# Patient Record
Sex: FEMALE | Race: WHITE
[De-identification: names, ages, dates, MRNs, and addresses within clinical notes are randomized per-mention and may not be internally consistent; named-entity substitution may affect disease eponyms.]

---

## 2022-07-12 ENCOUNTER — HOSPITAL ENCOUNTER (INPATIENT)
Dept: HOSPITAL 12 - ER | Age: 87
LOS: 5 days | Discharge: NURSING, REHAB OR LONG-TERM CARE | DRG: 280 | End: 2022-07-17
Admitting: INTERNAL MEDICINE
Payer: MEDICARE

## 2022-07-12 VITALS — WEIGHT: 275.44 LBS | HEIGHT: 59 IN | BODY MASS INDEX: 55.53 KG/M2

## 2022-07-12 VITALS — SYSTOLIC BLOOD PRESSURE: 119 MMHG | DIASTOLIC BLOOD PRESSURE: 58 MMHG

## 2022-07-12 DIAGNOSIS — I11.0: Primary | ICD-10-CM

## 2022-07-12 DIAGNOSIS — I48.91: ICD-10-CM

## 2022-07-12 DIAGNOSIS — Z74.09: ICD-10-CM

## 2022-07-12 DIAGNOSIS — J96.10: ICD-10-CM

## 2022-07-12 DIAGNOSIS — I25.5: ICD-10-CM

## 2022-07-12 DIAGNOSIS — Z95.1: ICD-10-CM

## 2022-07-12 DIAGNOSIS — I25.2: ICD-10-CM

## 2022-07-12 DIAGNOSIS — E78.5: ICD-10-CM

## 2022-07-12 DIAGNOSIS — I44.7: ICD-10-CM

## 2022-07-12 DIAGNOSIS — D68.69: ICD-10-CM

## 2022-07-12 DIAGNOSIS — I21.A1: ICD-10-CM

## 2022-07-12 DIAGNOSIS — I50.41: ICD-10-CM

## 2022-07-12 DIAGNOSIS — Z79.4: ICD-10-CM

## 2022-07-12 DIAGNOSIS — M19.90: ICD-10-CM

## 2022-07-12 DIAGNOSIS — Z20.822: ICD-10-CM

## 2022-07-12 DIAGNOSIS — E11.51: ICD-10-CM

## 2022-07-12 DIAGNOSIS — Z98.61: ICD-10-CM

## 2022-07-12 DIAGNOSIS — Z95.0: ICD-10-CM

## 2022-07-12 DIAGNOSIS — R91.8: ICD-10-CM

## 2022-07-12 DIAGNOSIS — E11.40: ICD-10-CM

## 2022-07-12 DIAGNOSIS — R53.1: ICD-10-CM

## 2022-07-12 DIAGNOSIS — I25.10: ICD-10-CM

## 2022-07-12 LAB
ALP SERPL-CCNC: 65 U/L (ref 50–136)
ALT SERPL W/O P-5'-P-CCNC: 19 U/L (ref 14–59)
AST SERPL-CCNC: 19 U/L (ref 15–37)
BILIRUB DIRECT SERPL-MCNC: 0.4 MG/DL (ref 0–0.2)
BILIRUB SERPL-MCNC: 0.6 MG/DL (ref 0.2–1)
BUN SERPL-MCNC: 23 MG/DL (ref 7–18)
CHLORIDE SERPL-SCNC: 101 MMOL/L (ref 98–107)
CO2 SERPL-SCNC: 32 MMOL/L (ref 21–32)
CREAT SERPL-MCNC: 0.9 MG/DL (ref 0.6–1.3)
GLUCOSE SERPL-MCNC: 214 MG/DL (ref 74–106)
HCT VFR BLD AUTO: 36.1 % (ref 31.2–41.9)
MCH RBC QN AUTO: 30.4 UUG (ref 24.7–32.8)
MCV RBC AUTO: 92.3 FL (ref 75.5–95.3)
PLATELET # BLD AUTO: 162 K/UL (ref 179–408)
POTASSIUM SERPL-SCNC: 4.5 MMOL/L (ref 3.5–5.1)
WS STN SPEC: 6.9 G/DL (ref 6.4–8.2)

## 2022-07-12 PROCEDURE — A4663 DIALYSIS BLOOD PRESSURE CUFF: HCPCS

## 2022-07-12 PROCEDURE — G0378 HOSPITAL OBSERVATION PER HR: HCPCS

## 2022-07-12 RX ADMIN — Medication SCH EACH: at 22:51

## 2022-07-12 RX ADMIN — MIRTAZAPINE SCH MG: 15 TABLET, FILM COATED ORAL at 22:45

## 2022-07-12 RX ADMIN — RANOLAZINE SCH MG: 500 TABLET, FILM COATED, EXTENDED RELEASE ORAL at 22:52

## 2022-07-12 RX ADMIN — DOCUSATE SODIUM SCH MG: 100 CAPSULE, LIQUID FILLED ORAL at 22:45

## 2022-07-12 RX ADMIN — CARVEDILOL SCH MG: 3.12 TABLET, FILM COATED ORAL at 22:44

## 2022-07-12 NOTE — NUR
Admitted patient on Tele floor under the care of Dr Ho, Patient alert oriented, no sob no 
chest pain, tele monitor, sinus rhythm with bbb, patient has no complain of pain, with right 
toes and feet wound with different stages of healing, with +1 edema, left foot has scab plus 
+1 edema, has dentures, patient has $300.00 cash, refused to surrender to staff for safe 
keeping, explained to patient the hospital not responsible when her money stolen or got lost 
for whatever reason.

## 2022-07-12 NOTE — NUR
Pt. admitted to  TELE room 316, under care of Dr. Ho

Belongs List completed

Rebeca RN, Chaitanya RN aware of patient's arrival

## 2022-07-13 VITALS — DIASTOLIC BLOOD PRESSURE: 45 MMHG | SYSTOLIC BLOOD PRESSURE: 113 MMHG

## 2022-07-13 VITALS — SYSTOLIC BLOOD PRESSURE: 126 MMHG | DIASTOLIC BLOOD PRESSURE: 73 MMHG

## 2022-07-13 VITALS — SYSTOLIC BLOOD PRESSURE: 124 MMHG | DIASTOLIC BLOOD PRESSURE: 60 MMHG

## 2022-07-13 VITALS — SYSTOLIC BLOOD PRESSURE: 150 MMHG | DIASTOLIC BLOOD PRESSURE: 67 MMHG

## 2022-07-13 VITALS — SYSTOLIC BLOOD PRESSURE: 126 MMHG | DIASTOLIC BLOOD PRESSURE: 65 MMHG

## 2022-07-13 VITALS — SYSTOLIC BLOOD PRESSURE: 118 MMHG | DIASTOLIC BLOOD PRESSURE: 58 MMHG

## 2022-07-13 LAB
ALP SERPL-CCNC: 58 U/L (ref 50–136)
ALT SERPL W/O P-5'-P-CCNC: 17 U/L (ref 14–59)
AST SERPL-CCNC: 19 U/L (ref 15–37)
BILIRUB SERPL-MCNC: 0.6 MG/DL (ref 0.2–1)
BUN SERPL-MCNC: 21 MG/DL (ref 7–18)
CHLORIDE SERPL-SCNC: 102 MMOL/L (ref 98–107)
CHOLEST SERPL-MCNC: 112 MG/DL (ref ?–200)
CO2 SERPL-SCNC: 36 MMOL/L (ref 21–32)
CREAT SERPL-MCNC: 0.9 MG/DL (ref 0.6–1.3)
GLUCOSE SERPL-MCNC: 133 MG/DL (ref 74–106)
HCT VFR BLD AUTO: 33.6 % (ref 31.2–41.9)
HDLC SERPL-MCNC: 38 MG/DL (ref 40–60)
MAGNESIUM SERPL-MCNC: 1.7 MG/DL (ref 1.8–2.4)
MCH RBC QN AUTO: 31 UUG (ref 24.7–32.8)
MCV RBC AUTO: 92.7 FL (ref 75.5–95.3)
PHOSPHATE SERPL-MCNC: 4.2 MG/DL (ref 2.5–4.9)
PLATELET # BLD AUTO: 140 K/UL (ref 179–408)
POTASSIUM SERPL-SCNC: 4 MMOL/L (ref 3.5–5.1)
TRIGL SERPL-MCNC: 52 MG/DL (ref 30–150)
TSH SERPL DL<=0.005 MIU/L-ACNC: 0.81 MIU/ML (ref 0.36–3.74)
WS STN SPEC: 6.3 G/DL (ref 6.4–8.2)

## 2022-07-13 RX ADMIN — MUPIROCIN SCH APPLIC: 20 OINTMENT TOPICAL at 20:32

## 2022-07-13 RX ADMIN — Medication SCH EACH: at 06:50

## 2022-07-13 RX ADMIN — Medication SCH EACH: at 12:00

## 2022-07-13 RX ADMIN — SODIUM CHLORIDE PRN UNIT: 9 INJECTION, SOLUTION INTRAVENOUS at 09:18

## 2022-07-13 RX ADMIN — Medication SCH EACH: at 17:13

## 2022-07-13 RX ADMIN — FUROSEMIDE SCH MG: 10 INJECTION, SOLUTION INTRAMUSCULAR; INTRAVENOUS at 17:00

## 2022-07-13 RX ADMIN — RANOLAZINE SCH MG: 500 TABLET, FILM COATED, EXTENDED RELEASE ORAL at 09:13

## 2022-07-13 RX ADMIN — SODIUM CHLORIDE PRN UNIT: 9 INJECTION, SOLUTION INTRAVENOUS at 12:00

## 2022-07-13 RX ADMIN — GABAPENTIN SCH MG: 100 CAPSULE ORAL at 09:13

## 2022-07-13 RX ADMIN — RANOLAZINE SCH MG: 500 TABLET, FILM COATED, EXTENDED RELEASE ORAL at 20:32

## 2022-07-13 RX ADMIN — CALCIUM SCH MG: 500 TABLET ORAL at 09:13

## 2022-07-13 RX ADMIN — ASPIRIN SCH MG: 81 TABLET, CHEWABLE ORAL at 09:13

## 2022-07-13 RX ADMIN — MIRTAZAPINE SCH MG: 15 TABLET, FILM COATED ORAL at 20:32

## 2022-07-13 RX ADMIN — SODIUM CHLORIDE PRN UNIT: 9 INJECTION, SOLUTION INTRAVENOUS at 20:38

## 2022-07-13 RX ADMIN — DOCUSATE SODIUM SCH MG: 100 CAPSULE, LIQUID FILLED ORAL at 20:32

## 2022-07-13 RX ADMIN — AMLODIPINE BESYLATE SCH MG: 5 TABLET ORAL at 09:16

## 2022-07-13 RX ADMIN — Medication SCH EACH: at 20:38

## 2022-07-13 RX ADMIN — SODIUM CHLORIDE PRN UNIT: 9 INJECTION, SOLUTION INTRAVENOUS at 17:19

## 2022-07-13 RX ADMIN — THERA TABS SCH UDTAB: TAB at 09:13

## 2022-07-13 RX ADMIN — CARVEDILOL SCH MG: 3.12 TABLET, FILM COATED ORAL at 09:17

## 2022-07-13 RX ADMIN — CARVEDILOL SCH MG: 3.12 TABLET, FILM COATED ORAL at 17:01

## 2022-07-13 RX ADMIN — CLOPIDOGREL BISULFATE SCH MG: 75 TABLET ORAL at 09:13

## 2022-07-13 NOTE — NUR
Received patient lying in bed. In no acute distress. Denies any pain or SOB at this time. On 
O2 at 2LPM via NC in place. O2 sat at 99%. NSR on tele with HR of 85/min. MRSA precaution 
observed. Gerard catheter intact and draining via gravity. Needs assessed and attended to. 
Safety measure initiated and call light within reached.

## 2022-07-13 NOTE — NUR
Pt is a/o x 3-4, presenting with sinus rhythm with bundle branch block on telemetry. SpO2 
99% on 3L nasal cannula, titrated to 2L NC SpO2 98%. Will continue to monitor and titrate as 
tolerated.

## 2022-07-14 VITALS — SYSTOLIC BLOOD PRESSURE: 126 MMHG | DIASTOLIC BLOOD PRESSURE: 68 MMHG

## 2022-07-14 VITALS — SYSTOLIC BLOOD PRESSURE: 123 MMHG | DIASTOLIC BLOOD PRESSURE: 57 MMHG

## 2022-07-14 VITALS — SYSTOLIC BLOOD PRESSURE: 123 MMHG | DIASTOLIC BLOOD PRESSURE: 62 MMHG

## 2022-07-14 VITALS — DIASTOLIC BLOOD PRESSURE: 46 MMHG | SYSTOLIC BLOOD PRESSURE: 114 MMHG

## 2022-07-14 VITALS — DIASTOLIC BLOOD PRESSURE: 58 MMHG | SYSTOLIC BLOOD PRESSURE: 128 MMHG

## 2022-07-14 LAB
BUN SERPL-MCNC: 24 MG/DL (ref 7–18)
CHLORIDE SERPL-SCNC: 102 MMOL/L (ref 98–107)
CO2 SERPL-SCNC: 37 MMOL/L (ref 21–32)
CREAT SERPL-MCNC: 0.8 MG/DL (ref 0.6–1.3)
GLUCOSE SERPL-MCNC: 83 MG/DL (ref 74–106)
HCT VFR BLD AUTO: 31.6 % (ref 31.2–41.9)
MAGNESIUM SERPL-MCNC: 1.7 MG/DL (ref 1.8–2.4)
MCH RBC QN AUTO: 31 UUG (ref 24.7–32.8)
MCV RBC AUTO: 92.2 FL (ref 75.5–95.3)
PHOSPHATE SERPL-MCNC: 4.2 MG/DL (ref 2.5–4.9)
PLATELET # BLD AUTO: 141 K/UL (ref 179–408)
POTASSIUM SERPL-SCNC: 4.5 MMOL/L (ref 3.5–5.1)

## 2022-07-14 RX ADMIN — DOCUSATE SODIUM SCH MG: 100 CAPSULE, LIQUID FILLED ORAL at 20:21

## 2022-07-14 RX ADMIN — MAGNESIUM SULFATE IN DEXTROSE SCH MLS/HR: 10 INJECTION, SOLUTION INTRAVENOUS at 19:15

## 2022-07-14 RX ADMIN — Medication SCH EACH: at 06:31

## 2022-07-14 RX ADMIN — MUPIROCIN SCH APPLIC: 20 OINTMENT TOPICAL at 20:26

## 2022-07-14 RX ADMIN — Medication SCH EACH: at 17:02

## 2022-07-14 RX ADMIN — FUROSEMIDE SCH MG: 10 INJECTION, SOLUTION INTRAMUSCULAR; INTRAVENOUS at 17:02

## 2022-07-14 RX ADMIN — AMLODIPINE BESYLATE SCH MG: 5 TABLET ORAL at 09:02

## 2022-07-14 RX ADMIN — SODIUM CHLORIDE PRN UNIT: 9 INJECTION, SOLUTION INTRAVENOUS at 17:15

## 2022-07-14 RX ADMIN — GABAPENTIN SCH MG: 100 CAPSULE ORAL at 09:02

## 2022-07-14 RX ADMIN — MAGNESIUM SULFATE IN DEXTROSE SCH MLS/HR: 10 INJECTION, SOLUTION INTRAVENOUS at 17:04

## 2022-07-14 RX ADMIN — RANOLAZINE SCH MG: 500 TABLET, FILM COATED, EXTENDED RELEASE ORAL at 09:02

## 2022-07-14 RX ADMIN — Medication SCH ML: at 17:02

## 2022-07-14 RX ADMIN — THERA TABS SCH UDTAB: TAB at 09:02

## 2022-07-14 RX ADMIN — ASPIRIN SCH MG: 81 TABLET, CHEWABLE ORAL at 09:01

## 2022-07-14 RX ADMIN — FUROSEMIDE SCH MG: 10 INJECTION, SOLUTION INTRAMUSCULAR; INTRAVENOUS at 08:57

## 2022-07-14 RX ADMIN — SODIUM CHLORIDE PRN UNIT: 9 INJECTION, SOLUTION INTRAVENOUS at 11:46

## 2022-07-14 RX ADMIN — CARVEDILOL SCH MG: 3.12 TABLET, FILM COATED ORAL at 17:02

## 2022-07-14 RX ADMIN — Medication SCH EACH: at 11:44

## 2022-07-14 RX ADMIN — MIRTAZAPINE SCH MG: 15 TABLET, FILM COATED ORAL at 20:21

## 2022-07-14 RX ADMIN — RANOLAZINE SCH MG: 500 TABLET, FILM COATED, EXTENDED RELEASE ORAL at 20:21

## 2022-07-14 RX ADMIN — Medication SCH EACH: at 20:26

## 2022-07-14 RX ADMIN — MUPIROCIN SCH APPLIC: 20 OINTMENT TOPICAL at 09:18

## 2022-07-14 RX ADMIN — SODIUM CHLORIDE PRN UNIT: 9 INJECTION, SOLUTION INTRAVENOUS at 20:28

## 2022-07-14 RX ADMIN — CARVEDILOL SCH MG: 3.12 TABLET, FILM COATED ORAL at 09:01

## 2022-07-14 RX ADMIN — CALCIUM SCH MG: 500 TABLET ORAL at 09:02

## 2022-07-14 RX ADMIN — CLOPIDOGREL BISULFATE SCH MG: 75 TABLET ORAL at 09:02

## 2022-07-14 NOTE — NUR
Patient c/o SOB, 02 sats 95% on 2L. Breathing is shallow, deep pursed lip breathing teaching 
provided, able to demonstrate back. patient 02 increased to 3L per patient request and 
breathing treatment requested to RT.

## 2022-07-14 NOTE — NUR
In no acute distress. NSR on tele with HR of 75/min. Gerard catheter intact and draining via 
gravity. Needs attended to and met. Safety measure maintained and call light within reached.

## 2022-07-14 NOTE — NUR
Received patient lying in bed. Family at bedside. In no acute distress. Denies any pain or 
SOB at this time. On O2 at 3LPM via NC in place. NSR on tele with HR of 98/min. IV site on 
left wrist area intact and patent. Magnesium infusing. MRSA precaution observed. Gerard 
catheter intact and draining via gravity. Needs assessed and attended to. Safety measure 
initiated and call light within reached.

## 2022-07-15 VITALS — DIASTOLIC BLOOD PRESSURE: 51 MMHG | SYSTOLIC BLOOD PRESSURE: 112 MMHG

## 2022-07-15 VITALS — DIASTOLIC BLOOD PRESSURE: 58 MMHG | SYSTOLIC BLOOD PRESSURE: 127 MMHG

## 2022-07-15 VITALS — SYSTOLIC BLOOD PRESSURE: 109 MMHG | DIASTOLIC BLOOD PRESSURE: 55 MMHG

## 2022-07-15 VITALS — SYSTOLIC BLOOD PRESSURE: 117 MMHG | DIASTOLIC BLOOD PRESSURE: 59 MMHG

## 2022-07-15 VITALS — DIASTOLIC BLOOD PRESSURE: 76 MMHG | SYSTOLIC BLOOD PRESSURE: 149 MMHG

## 2022-07-15 LAB
BUN SERPL-MCNC: 23 MG/DL (ref 7–18)
CHLORIDE SERPL-SCNC: 97 MMOL/L (ref 98–107)
CO2 SERPL-SCNC: 38 MMOL/L (ref 21–32)
CREAT SERPL-MCNC: 0.8 MG/DL (ref 0.6–1.3)
GLUCOSE SERPL-MCNC: 130 MG/DL (ref 74–106)
HCT VFR BLD AUTO: 32.4 % (ref 31.2–41.9)
MAGNESIUM SERPL-MCNC: 1.9 MG/DL (ref 1.8–2.4)
MCH RBC QN AUTO: 30.7 UUG (ref 24.7–32.8)
MCV RBC AUTO: 91.8 FL (ref 75.5–95.3)
PHOSPHATE SERPL-MCNC: 3.5 MG/DL (ref 2.5–4.9)
PLATELET # BLD AUTO: 145 K/UL (ref 179–408)
POTASSIUM SERPL-SCNC: 3.5 MMOL/L (ref 3.5–5.1)

## 2022-07-15 RX ADMIN — MIRTAZAPINE SCH MG: 15 TABLET, FILM COATED ORAL at 20:02

## 2022-07-15 RX ADMIN — SODIUM CHLORIDE PRN UNIT: 9 INJECTION, SOLUTION INTRAVENOUS at 09:24

## 2022-07-15 RX ADMIN — ASPIRIN SCH MG: 81 TABLET, CHEWABLE ORAL at 09:12

## 2022-07-15 RX ADMIN — Medication SCH ML: at 16:37

## 2022-07-15 RX ADMIN — FUROSEMIDE SCH MG: 10 INJECTION, SOLUTION INTRAMUSCULAR; INTRAVENOUS at 09:10

## 2022-07-15 RX ADMIN — Medication SCH EACH: at 11:31

## 2022-07-15 RX ADMIN — Medication SCH ML: at 09:13

## 2022-07-15 RX ADMIN — MUPIROCIN SCH APPLIC: 20 OINTMENT TOPICAL at 20:12

## 2022-07-15 RX ADMIN — DOCUSATE SODIUM SCH MG: 100 CAPSULE, LIQUID FILLED ORAL at 20:02

## 2022-07-15 RX ADMIN — CLOPIDOGREL BISULFATE SCH MG: 75 TABLET ORAL at 09:12

## 2022-07-15 RX ADMIN — RANOLAZINE SCH MG: 500 TABLET, FILM COATED, EXTENDED RELEASE ORAL at 09:12

## 2022-07-15 RX ADMIN — CARVEDILOL SCH MG: 3.12 TABLET, FILM COATED ORAL at 16:36

## 2022-07-15 RX ADMIN — THERA TABS SCH UDTAB: TAB at 09:11

## 2022-07-15 RX ADMIN — CARVEDILOL SCH MG: 3.12 TABLET, FILM COATED ORAL at 09:12

## 2022-07-15 RX ADMIN — FUROSEMIDE SCH MG: 10 INJECTION, SOLUTION INTRAMUSCULAR; INTRAVENOUS at 16:36

## 2022-07-15 RX ADMIN — RANOLAZINE SCH MG: 500 TABLET, FILM COATED, EXTENDED RELEASE ORAL at 20:04

## 2022-07-15 RX ADMIN — MUPIROCIN SCH APPLIC: 20 OINTMENT TOPICAL at 09:13

## 2022-07-15 RX ADMIN — GABAPENTIN SCH MG: 100 CAPSULE ORAL at 09:11

## 2022-07-15 RX ADMIN — SODIUM CHLORIDE PRN UNIT: 9 INJECTION, SOLUTION INTRAVENOUS at 20:19

## 2022-07-15 RX ADMIN — SODIUM CHLORIDE PRN UNIT: 9 INJECTION, SOLUTION INTRAVENOUS at 16:45

## 2022-07-15 RX ADMIN — AMLODIPINE BESYLATE SCH MG: 5 TABLET ORAL at 09:12

## 2022-07-15 RX ADMIN — Medication SCH EACH: at 16:36

## 2022-07-15 RX ADMIN — CALCIUM SCH MG: 500 TABLET ORAL at 09:12

## 2022-07-15 RX ADMIN — SODIUM CHLORIDE PRN UNIT: 9 INJECTION, SOLUTION INTRAVENOUS at 11:35

## 2022-07-15 RX ADMIN — Medication SCH EACH: at 06:37

## 2022-07-15 RX ADMIN — Medication SCH EACH: at 20:17

## 2022-07-15 NOTE — NUR
Patient slept well during the night. In no acute distress. NSR on tele with HR of 85/min. 
Gerard catheter intact and draining via gravity. Needs attended to and met. Safety measure 
maintained and call light within reached.

## 2022-07-15 NOTE — NUR
Patient is alert and oriented *4. Denies any pain. Able to ambulate a few feet with PT with 
assistance and walker, also participates with OT.  On 2L 02 via NC, improved SOB noted as 
compared to 07/14/22 am shift. Denies any chest pain. On telemetry, vpacing with HR in the 
80's  and occasional bundle branch block. with HR in the 80's. Gerard catheter in place, 
draining clear yellow urine. Call light within reach, All needs attended.

## 2022-07-15 NOTE — NUR
Patient alert oriented, no sob no chest pain when at rest, still complain of swelling of the 
leg and thighs, kept leg elevated with pillow, denies pain at this time, unable to lay flat, 
short of breath, cont to monitor,

## 2022-07-16 VITALS — SYSTOLIC BLOOD PRESSURE: 113 MMHG | DIASTOLIC BLOOD PRESSURE: 46 MMHG

## 2022-07-16 VITALS — DIASTOLIC BLOOD PRESSURE: 73 MMHG | SYSTOLIC BLOOD PRESSURE: 133 MMHG

## 2022-07-16 VITALS — DIASTOLIC BLOOD PRESSURE: 56 MMHG | SYSTOLIC BLOOD PRESSURE: 110 MMHG

## 2022-07-16 VITALS — SYSTOLIC BLOOD PRESSURE: 134 MMHG | DIASTOLIC BLOOD PRESSURE: 64 MMHG

## 2022-07-16 VITALS — SYSTOLIC BLOOD PRESSURE: 141 MMHG | DIASTOLIC BLOOD PRESSURE: 68 MMHG

## 2022-07-16 LAB
BUN SERPL-MCNC: 24 MG/DL (ref 7–18)
CHLORIDE SERPL-SCNC: 93 MMOL/L (ref 98–107)
CO2 SERPL-SCNC: 41 MMOL/L (ref 21–32)
CREAT SERPL-MCNC: 0.9 MG/DL (ref 0.6–1.3)
GLUCOSE SERPL-MCNC: 159 MG/DL (ref 74–106)
HCT VFR BLD AUTO: 32.1 % (ref 31.2–41.9)
MAGNESIUM SERPL-MCNC: 1.6 MG/DL (ref 1.8–2.4)
MCH RBC QN AUTO: 30.1 UUG (ref 24.7–32.8)
MCV RBC AUTO: 90.9 FL (ref 75.5–95.3)
PHOSPHATE SERPL-MCNC: 3.5 MG/DL (ref 2.5–4.9)
PLATELET # BLD AUTO: 143 K/UL (ref 179–408)
POTASSIUM SERPL-SCNC: 3 MMOL/L (ref 3.5–5.1)

## 2022-07-16 PROCEDURE — B546ZZA ULTRASONOGRAPHY OF RIGHT SUBCLAVIAN VEIN, GUIDANCE: ICD-10-PCS

## 2022-07-16 PROCEDURE — 05H533Z INSERTION OF INFUSION DEVICE INTO RIGHT SUBCLAVIAN VEIN, PERCUTANEOUS APPROACH: ICD-10-PCS

## 2022-07-16 RX ADMIN — Medication SCH EACH: at 11:03

## 2022-07-16 RX ADMIN — CARVEDILOL SCH MG: 3.12 TABLET, FILM COATED ORAL at 17:19

## 2022-07-16 RX ADMIN — THERA TABS SCH UDTAB: TAB at 08:54

## 2022-07-16 RX ADMIN — FUROSEMIDE SCH MG: 10 INJECTION, SOLUTION INTRAMUSCULAR; INTRAVENOUS at 17:15

## 2022-07-16 RX ADMIN — SODIUM CHLORIDE PRN UNIT: 9 INJECTION, SOLUTION INTRAVENOUS at 11:44

## 2022-07-16 RX ADMIN — MUPIROCIN SCH APPLIC: 20 OINTMENT TOPICAL at 08:54

## 2022-07-16 RX ADMIN — GABAPENTIN SCH MG: 100 CAPSULE ORAL at 08:55

## 2022-07-16 RX ADMIN — CALCIUM SCH MG: 500 TABLET ORAL at 08:54

## 2022-07-16 RX ADMIN — SODIUM CHLORIDE PRN UNIT: 9 INJECTION, SOLUTION INTRAVENOUS at 17:14

## 2022-07-16 RX ADMIN — DOCUSATE SODIUM SCH MG: 100 CAPSULE, LIQUID FILLED ORAL at 20:57

## 2022-07-16 RX ADMIN — SODIUM CHLORIDE PRN UNIT: 9 INJECTION, SOLUTION INTRAVENOUS at 21:00

## 2022-07-16 RX ADMIN — Medication SCH EACH: at 20:58

## 2022-07-16 RX ADMIN — RANOLAZINE SCH MG: 500 TABLET, FILM COATED, EXTENDED RELEASE ORAL at 20:57

## 2022-07-16 RX ADMIN — RANOLAZINE SCH MG: 500 TABLET, FILM COATED, EXTENDED RELEASE ORAL at 08:58

## 2022-07-16 RX ADMIN — AMLODIPINE BESYLATE SCH MG: 5 TABLET ORAL at 08:55

## 2022-07-16 RX ADMIN — Medication SCH EACH: at 17:08

## 2022-07-16 RX ADMIN — MUPIROCIN SCH APPLIC: 20 OINTMENT TOPICAL at 21:00

## 2022-07-16 RX ADMIN — FUROSEMIDE SCH MG: 10 INJECTION, SOLUTION INTRAMUSCULAR; INTRAVENOUS at 10:53

## 2022-07-16 RX ADMIN — ASPIRIN SCH MG: 81 TABLET, CHEWABLE ORAL at 08:54

## 2022-07-16 RX ADMIN — CARVEDILOL SCH MG: 3.12 TABLET, FILM COATED ORAL at 08:56

## 2022-07-16 RX ADMIN — Medication SCH ML: at 17:17

## 2022-07-16 RX ADMIN — Medication SCH ML: at 08:54

## 2022-07-16 RX ADMIN — MIRTAZAPINE SCH MG: 15 TABLET, FILM COATED ORAL at 20:57

## 2022-07-16 RX ADMIN — CLOPIDOGREL BISULFATE SCH MG: 75 TABLET ORAL at 08:55

## 2022-07-16 RX ADMIN — Medication SCH EACH: at 06:00

## 2022-07-16 NOTE — NUR
Patient alert oriented, no sob no chest pain, tele monitor sinus rhythm, v pacing no 
complain of pain, still on 2 liters of oxygen, unable to lay flat in bed short of breath, 
kept hob elevated 35 to 45 degrees, denies pain at this time, cont to monitor.

## 2022-07-16 NOTE — NUR
Received pt. AAOX4. vitals stable on sinus rhythm V- pacing. No IV access. Will continue to 
monitor.

## 2022-07-16 NOTE — NUR
DCD instructions given to patient who verbalized understanding. Patient AAOX4. and able to 
sign all the documentation. With belongings a total of $365 was documented. Nursing office 
called to retrieved the money, since there it no deposit receipt in the Pt's file. As per 
supervisor there's no money in the safety under pt's name. Patient questioned about the 
money and as stated by patient "my nice took the money  and my other belongings home". and 
she signed the belongings list Zenaida CNMELINDA witness pt's statement. supervisor informed. and 
attempts to contact her niece at  [phone] to inquire pt's statements message left. `

## 2022-07-16 NOTE — NUR
per Lachelle Nicolas, Pt will not be discharged tonight per ; she informed Jaya Mccormick. pt apparently med surg status.

## 2022-07-17 VITALS — SYSTOLIC BLOOD PRESSURE: 139 MMHG | DIASTOLIC BLOOD PRESSURE: 68 MMHG

## 2022-07-17 VITALS — SYSTOLIC BLOOD PRESSURE: 123 MMHG | DIASTOLIC BLOOD PRESSURE: 59 MMHG

## 2022-07-17 LAB
BUN SERPL-MCNC: 22 MG/DL (ref 7–18)
CHLORIDE SERPL-SCNC: 94 MMOL/L (ref 98–107)
CO2 SERPL-SCNC: 42 MMOL/L (ref 21–32)
CREAT SERPL-MCNC: 0.7 MG/DL (ref 0.6–1.3)
GLUCOSE SERPL-MCNC: 155 MG/DL (ref 74–106)
MAGNESIUM SERPL-MCNC: 1.7 MG/DL (ref 1.8–2.4)
POTASSIUM SERPL-SCNC: 3.3 MMOL/L (ref 3.5–5.1)

## 2022-07-17 RX ADMIN — Medication SCH ML: at 08:36

## 2022-07-17 RX ADMIN — Medication SCH EACH: at 06:32

## 2022-07-17 RX ADMIN — CLOPIDOGREL BISULFATE SCH MG: 75 TABLET ORAL at 08:36

## 2022-07-17 RX ADMIN — AMLODIPINE BESYLATE SCH MG: 5 TABLET ORAL at 08:33

## 2022-07-17 RX ADMIN — MUPIROCIN SCH APPLIC: 20 OINTMENT TOPICAL at 08:36

## 2022-07-17 RX ADMIN — CALCIUM SCH MG: 500 TABLET ORAL at 08:32

## 2022-07-17 RX ADMIN — FUROSEMIDE SCH MG: 10 INJECTION, SOLUTION INTRAMUSCULAR; INTRAVENOUS at 08:31

## 2022-07-17 RX ADMIN — RANOLAZINE SCH MG: 500 TABLET, FILM COATED, EXTENDED RELEASE ORAL at 08:32

## 2022-07-17 RX ADMIN — THERA TABS SCH UDTAB: TAB at 08:32

## 2022-07-17 RX ADMIN — ASPIRIN SCH MG: 81 TABLET, CHEWABLE ORAL at 08:32

## 2022-07-17 RX ADMIN — GABAPENTIN SCH MG: 100 CAPSULE ORAL at 08:32

## 2022-07-17 RX ADMIN — CARVEDILOL SCH MG: 3.12 TABLET, FILM COATED ORAL at 08:33

## 2022-07-17 RX ADMIN — SODIUM CHLORIDE PRN UNIT: 9 INJECTION, SOLUTION INTRAVENOUS at 08:37

## 2022-07-17 NOTE — NUR
unable to weigh pt for now; it says 278lbd ;  pt does not look she weighs 278lbs; will 
transfer to chair and zero bed and weigh pt.

## 2022-07-17 NOTE — NUR
awake alert and oriented, no distress noted, on 2l/nc sat at 96%, aware that she is going to 
SNF today, needs attended, safety measures maintained

## 2022-07-17 NOTE — NUR
readied for discharge, midline on right upper arm removed- no swelling/redness noted on 
site, all belongings with her, report called to Cristofer at [facility],

## 2022-08-17 ENCOUNTER — HOSPITAL ENCOUNTER (INPATIENT)
Dept: HOSPITAL 12 - ER | Age: 87
LOS: 10 days | Discharge: NURSING, REHAB OR LONG-TERM CARE | DRG: 280 | End: 2022-08-27
Payer: MEDICARE

## 2022-08-17 VITALS — HEIGHT: 60 IN | BODY MASS INDEX: 29.45 KG/M2 | WEIGHT: 150 LBS

## 2022-08-17 VITALS — DIASTOLIC BLOOD PRESSURE: 56 MMHG | SYSTOLIC BLOOD PRESSURE: 108 MMHG

## 2022-08-17 DIAGNOSIS — I25.2: ICD-10-CM
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
## 2022-08-17 DIAGNOSIS — F32.A: ICD-10-CM

## 2022-08-17 DIAGNOSIS — I21.A1: ICD-10-CM

## 2022-08-17 DIAGNOSIS — E11.51: ICD-10-CM

## 2022-08-17 DIAGNOSIS — I13.0: Primary | ICD-10-CM

## 2022-08-17 DIAGNOSIS — E11.22: ICD-10-CM

## 2022-08-17 DIAGNOSIS — I48.0: ICD-10-CM

## 2022-08-17 DIAGNOSIS — Z95.1: ICD-10-CM

## 2022-08-17 DIAGNOSIS — L97.519: ICD-10-CM

## 2022-08-17 DIAGNOSIS — I25.5: ICD-10-CM

## 2022-08-17 DIAGNOSIS — D68.59: ICD-10-CM

## 2022-08-17 DIAGNOSIS — Z79.899: ICD-10-CM

## 2022-08-17 DIAGNOSIS — N18.9: ICD-10-CM

## 2022-08-17 DIAGNOSIS — Z79.4: ICD-10-CM

## 2022-08-17 DIAGNOSIS — Z79.84: ICD-10-CM

## 2022-08-17 DIAGNOSIS — L97.529: ICD-10-CM

## 2022-08-17 DIAGNOSIS — I44.7: ICD-10-CM

## 2022-08-17 DIAGNOSIS — I50.43: ICD-10-CM

## 2022-08-17 DIAGNOSIS — E83.39: ICD-10-CM
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
## 2022-08-17 DIAGNOSIS — I48.92: ICD-10-CM

## 2022-08-17 DIAGNOSIS — I25.10: ICD-10-CM

## 2022-08-17 DIAGNOSIS — Z95.0: ICD-10-CM

## 2022-08-17 DIAGNOSIS — E87.1: ICD-10-CM

## 2022-08-17 DIAGNOSIS — Z79.82: ICD-10-CM

## 2022-08-17 DIAGNOSIS — I77.1: ICD-10-CM

## 2022-08-17 DIAGNOSIS — E88.09: ICD-10-CM

## 2022-08-17 DIAGNOSIS — K59.00: ICD-10-CM

## 2022-08-17 DIAGNOSIS — N17.0: ICD-10-CM

## 2022-08-17 DIAGNOSIS — S90.521A: ICD-10-CM
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
## 2022-08-17 DIAGNOSIS — E78.5: ICD-10-CM
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
## 2022-08-17 LAB
ALP SERPL-CCNC: 61 U/L (ref 50–136)
ALT SERPL W/O P-5'-P-CCNC: 14 U/L (ref 14–59)
AST SERPL-CCNC: 17 U/L (ref 15–37)
BILIRUB SERPL-MCNC: 0.7 MG/DL (ref 0.2–1)
BUN SERPL-MCNC: 38 MG/DL (ref 7–18)
CHLORIDE SERPL-SCNC: 103 MMOL/L (ref 98–107)
CO2 SERPL-SCNC: 30 MMOL/L (ref 21–32)
CREAT SERPL-MCNC: 1.1 MG/DL (ref 0.6–1.3)
GLUCOSE SERPL-MCNC: 166 MG/DL (ref 74–106)
HCT VFR BLD AUTO: 36.3 % (ref 31.2–41.9)
MCH RBC QN AUTO: 31.2 UUG (ref 24.7–32.8)
MCV RBC AUTO: 96 FL (ref 75.5–95.3)
PLATELET # BLD AUTO: 160 K/UL (ref 179–408)
POTASSIUM SERPL-SCNC: 4.1 MMOL/L (ref 3.5–5.1)
WS STN SPEC: 6.9 G/DL (ref 6.4–8.2)

## 2022-08-17 PROCEDURE — G0378 HOSPITAL OBSERVATION PER HR: HCPCS

## 2022-08-17 PROCEDURE — A4663 DIALYSIS BLOOD PRESSURE CUFF: HCPCS

## 2022-08-17 PROCEDURE — A6209 FOAM DRSG <=16 SQ IN W/O BDR: HCPCS

## 2022-08-17 RX ADMIN — MAGNESIUM SULFATE IN DEXTROSE SCH MLS/HR: 10 INJECTION, SOLUTION INTRAVENOUS at 17:50

## 2022-08-17 RX ADMIN — ENOXAPARIN SODIUM SCH MG: 40 INJECTION SUBCUTANEOUS at 22:08

## 2022-08-17 RX ADMIN — MAGNESIUM SULFATE IN DEXTROSE SCH MLS/HR: 10 INJECTION, SOLUTION INTRAVENOUS at 22:07

## 2022-08-17 NOTE — NUR
pt transported to room 311 via [facility] with all belongings.  KRIS Robertson at 
bedside to receive the pt.

## 2022-08-18 VITALS — SYSTOLIC BLOOD PRESSURE: 94 MMHG | DIASTOLIC BLOOD PRESSURE: 48 MMHG

## 2022-08-18 VITALS — DIASTOLIC BLOOD PRESSURE: 52 MMHG | SYSTOLIC BLOOD PRESSURE: 119 MMHG

## 2022-08-18 VITALS — DIASTOLIC BLOOD PRESSURE: 43 MMHG | SYSTOLIC BLOOD PRESSURE: 96 MMHG

## 2022-08-18 LAB
BUN SERPL-MCNC: 39 MG/DL (ref 7–18)
CHLORIDE SERPL-SCNC: 102 MMOL/L (ref 98–107)
CO2 SERPL-SCNC: 25 MMOL/L (ref 21–32)
CREAT SERPL-MCNC: 1.1 MG/DL (ref 0.6–1.3)
GLUCOSE SERPL-MCNC: 168 MG/DL (ref 74–106)
HCT VFR BLD AUTO: 36.6 % (ref 31.2–41.9)
MAGNESIUM SERPL-MCNC: 2.8 MG/DL (ref 1.8–2.4)
MCH RBC QN AUTO: 31.3 UUG (ref 24.7–32.8)
MCV RBC AUTO: 97.4 FL (ref 75.5–95.3)
PHOSPHATE SERPL-MCNC: 5 MG/DL (ref 2.5–4.9)
PLATELET # BLD AUTO: 141 K/UL (ref 179–408)
POTASSIUM SERPL-SCNC: 4.2 MMOL/L (ref 3.5–5.1)

## 2022-08-18 RX ADMIN — SODIUM CHLORIDE PRN UNIT: 9 INJECTION, SOLUTION INTRAVENOUS at 16:37

## 2022-08-18 RX ADMIN — FUROSEMIDE SCH MG: 10 INJECTION, SOLUTION INTRAMUSCULAR; INTRAVENOUS at 20:27

## 2022-08-18 RX ADMIN — SODIUM CHLORIDE PRN UNIT: 9 INJECTION, SOLUTION INTRAVENOUS at 20:37

## 2022-08-18 RX ADMIN — INSULIN GLARGINE SCH UNITS: 100 INJECTION, SOLUTION SUBCUTANEOUS at 21:04

## 2022-08-18 RX ADMIN — FUROSEMIDE SCH MG: 10 INJECTION, SOLUTION INTRAMUSCULAR; INTRAVENOUS at 09:38

## 2022-08-18 RX ADMIN — Medication SCH EACH: at 16:36

## 2022-08-18 RX ADMIN — FERROUS SULFATE TAB 325 MG (65 MG ELEMENTAL FE) SCH MG: 325 (65 FE) TAB at 16:36

## 2022-08-18 RX ADMIN — ENOXAPARIN SODIUM SCH MG: 40 INJECTION SUBCUTANEOUS at 20:27

## 2022-08-18 RX ADMIN — LIDOCAINE SCH PATCH: 50 PATCH CUTANEOUS at 21:31

## 2022-08-18 RX ADMIN — HYDROCODONE BITARTRATE AND ACETAMINOPHEN PRN TAB: 5; 325 TABLET ORAL at 00:19

## 2022-08-18 RX ADMIN — HYDROCODONE BITARTRATE AND ACETAMINOPHEN PRN TAB: 5; 325 TABLET ORAL at 20:29

## 2022-08-18 RX ADMIN — PANTOPRAZOLE SODIUM SCH MG: 40 TABLET, DELAYED RELEASE ORAL at 07:00

## 2022-08-18 RX ADMIN — Medication SCH EACH: at 21:03

## 2022-08-18 RX ADMIN — MIRTAZAPINE SCH MG: 15 TABLET, FILM COATED ORAL at 17:04

## 2022-08-19 VITALS — SYSTOLIC BLOOD PRESSURE: 127 MMHG | DIASTOLIC BLOOD PRESSURE: 66 MMHG

## 2022-08-19 VITALS — SYSTOLIC BLOOD PRESSURE: 103 MMHG | DIASTOLIC BLOOD PRESSURE: 58 MMHG

## 2022-08-19 VITALS — DIASTOLIC BLOOD PRESSURE: 59 MMHG | SYSTOLIC BLOOD PRESSURE: 116 MMHG

## 2022-08-19 VITALS — DIASTOLIC BLOOD PRESSURE: 51 MMHG | SYSTOLIC BLOOD PRESSURE: 112 MMHG

## 2022-08-19 VITALS — SYSTOLIC BLOOD PRESSURE: 109 MMHG | DIASTOLIC BLOOD PRESSURE: 52 MMHG

## 2022-08-19 LAB
ALP SERPL-CCNC: 55 U/L (ref 50–136)
ALT SERPL W/O P-5'-P-CCNC: 21 U/L (ref 14–59)
AST SERPL-CCNC: 16 U/L (ref 15–37)
BILIRUB SERPL-MCNC: 0.7 MG/DL (ref 0.2–1)
BUN SERPL-MCNC: 40 MG/DL (ref 7–18)
CHLORIDE SERPL-SCNC: 100 MMOL/L (ref 98–107)
CO2 SERPL-SCNC: 31 MMOL/L (ref 21–32)
CREAT SERPL-MCNC: 1.2 MG/DL (ref 0.6–1.3)
GLUCOSE SERPL-MCNC: 157 MG/DL (ref 74–106)
HCT VFR BLD AUTO: 34 % (ref 31.2–41.9)
MAGNESIUM SERPL-MCNC: 2.1 MG/DL (ref 1.8–2.4)
MCH RBC QN AUTO: 31.6 UUG (ref 24.7–32.8)
MCV RBC AUTO: 96 FL (ref 75.5–95.3)
PLATELET # BLD AUTO: 147 K/UL (ref 179–408)
POTASSIUM SERPL-SCNC: 3.7 MMOL/L (ref 3.5–5.1)
WS STN SPEC: 6.3 G/DL (ref 6.4–8.2)

## 2022-08-19 RX ADMIN — FERROUS SULFATE TAB 325 MG (65 MG ELEMENTAL FE) SCH MG: 325 (65 FE) TAB at 16:52

## 2022-08-19 RX ADMIN — Medication SCH EACH: at 11:36

## 2022-08-19 RX ADMIN — Medication SCH EACH: at 21:45

## 2022-08-19 RX ADMIN — FUROSEMIDE SCH MG: 10 INJECTION, SOLUTION INTRAMUSCULAR; INTRAVENOUS at 20:02

## 2022-08-19 RX ADMIN — Medication SCH EACH: at 16:24

## 2022-08-19 RX ADMIN — CLOPIDOGREL BISULFATE SCH MG: 75 TABLET ORAL at 08:29

## 2022-08-19 RX ADMIN — FERROUS SULFATE TAB 325 MG (65 MG ELEMENTAL FE) SCH MG: 325 (65 FE) TAB at 08:18

## 2022-08-19 RX ADMIN — PANTOPRAZOLE SODIUM SCH MG: 40 TABLET, DELAYED RELEASE ORAL at 06:37

## 2022-08-19 RX ADMIN — LINAGLIPTIN SCH MG: 5 TABLET, FILM COATED ORAL at 08:29

## 2022-08-19 RX ADMIN — ASPIRIN SCH MG: 81 TABLET, CHEWABLE ORAL at 08:32

## 2022-08-19 RX ADMIN — Medication PRN MG: at 08:18

## 2022-08-19 RX ADMIN — ENOXAPARIN SODIUM SCH MG: 40 INJECTION SUBCUTANEOUS at 21:45

## 2022-08-19 RX ADMIN — SODIUM CHLORIDE PRN UNIT: 9 INJECTION, SOLUTION INTRAVENOUS at 08:16

## 2022-08-19 RX ADMIN — Medication SCH MG: at 08:30

## 2022-08-19 RX ADMIN — FUROSEMIDE SCH MG: 10 INJECTION, SOLUTION INTRAMUSCULAR; INTRAVENOUS at 08:17

## 2022-08-19 RX ADMIN — LIDOCAINE SCH PATCH: 50 PATCH CUTANEOUS at 08:29

## 2022-08-19 RX ADMIN — SODIUM CHLORIDE PRN UNIT: 9 INJECTION, SOLUTION INTRAVENOUS at 11:37

## 2022-08-19 RX ADMIN — MIRTAZAPINE SCH MG: 15 TABLET, FILM COATED ORAL at 17:25

## 2022-08-19 RX ADMIN — SODIUM CHLORIDE PRN UNIT: 9 INJECTION, SOLUTION INTRAVENOUS at 21:48

## 2022-08-19 RX ADMIN — Medication SCH TAB: at 08:29

## 2022-08-19 RX ADMIN — DEXTROSE PRN MLS/HR: 5 SOLUTION INTRAVENOUS at 19:13

## 2022-08-19 RX ADMIN — CALCIUM SCH MG: 500 TABLET ORAL at 08:29

## 2022-08-19 RX ADMIN — Medication SCH EACH: at 07:10

## 2022-08-19 RX ADMIN — INSULIN GLARGINE SCH UNITS: 100 INJECTION, SOLUTION SUBCUTANEOUS at 21:44

## 2022-08-19 NOTE — NUR
WOUND CARE CONSULT: PT PRESENTS WITH DRY WOUND TO RT HEEL, BLISTERS (INTACT) TO RT 
ANKLE/LOWER LEG AREA AND DRY WOUNDS TO TOES, ALL PRESENT ON ADMISSION. DR PRATT NOTIFIED 
OF DPM CONSULT REQUEST. DISCUSSED SKIN PROTECTION WITH NURSING STAFF. MD IN AGREEMENT WITH 
PLAN OF CARE.

## 2022-08-19 NOTE — NUR
rounds made patient in bed awake alert , verbally responsive . patient complaining at the 
AIR condition in her room is not working ,noted to be sob upon exertion and patient is so 
anxious . provided electric fan . patient verbalized she feels better .

## 2022-08-19 NOTE — NUR
AWAKE ALERT AND VERBALLY RESPONSIVE, COOPERATIVE AND ABLE TO VERBALIZE NEEDS WELL. CONTINUE 
TELE STATUS, SR ON MONITOR

## 2022-08-19 NOTE — NUR
patient incontinent of stool soft moderate in amt ,with cnas help cleaned patient chnaged 
soiled linens and gown ,turned and reposition .

## 2022-08-19 NOTE — NUR
rounds made day shift RN at bedside and as per RN she just started patient on amiodarone 
drip to follow amiodarone drip per protocol c/o svt DR:ELMER was notified and md is 
aware .patient in bed denies chest pain when asked.Gerard catheter to bsd . 02 nasal cannula 
at 2 l/min .

## 2022-08-19 NOTE — NUR
patient extremely agiated; pt pulling out lines and attempting to jump out of bed; CIWA 18 
previously had ativan;  pt placed on blayne wrist restraints; referred to MD on call; Emilia Davis NP ordered blayne restraints and one dose  of Haldol 5 mg IM; orders carried out and 
administered Haldol

-------------------------------------------------------------------------------

Addendum: 08/19/22 at 0029 by TIFFANIE BELTRAN RN

-------------------------------------------------------------------------------

not for this patient.

## 2022-08-19 NOTE — NUR
DR CAMPBELL NOTIFIED OF HR SUSTAINING , //69, -150, DENIES CHEST 
PAIN, O2 % ON 2L NC. AWAITING CALL BACK

## 2022-08-20 VITALS — SYSTOLIC BLOOD PRESSURE: 119 MMHG | DIASTOLIC BLOOD PRESSURE: 60 MMHG

## 2022-08-20 VITALS — DIASTOLIC BLOOD PRESSURE: 60 MMHG | SYSTOLIC BLOOD PRESSURE: 108 MMHG

## 2022-08-20 VITALS — DIASTOLIC BLOOD PRESSURE: 48 MMHG | SYSTOLIC BLOOD PRESSURE: 107 MMHG

## 2022-08-20 VITALS — DIASTOLIC BLOOD PRESSURE: 45 MMHG | SYSTOLIC BLOOD PRESSURE: 103 MMHG

## 2022-08-20 VITALS — DIASTOLIC BLOOD PRESSURE: 51 MMHG | SYSTOLIC BLOOD PRESSURE: 103 MMHG

## 2022-08-20 VITALS — DIASTOLIC BLOOD PRESSURE: 52 MMHG | SYSTOLIC BLOOD PRESSURE: 93 MMHG

## 2022-08-20 LAB
ALP SERPL-CCNC: 65 U/L (ref 50–136)
ALT SERPL W/O P-5'-P-CCNC: 16 U/L (ref 14–59)
AST SERPL-CCNC: 68 U/L (ref 15–37)
BILIRUB SERPL-MCNC: 1.2 MG/DL (ref 0.2–1)
BUN SERPL-MCNC: 45 MG/DL (ref 7–18)
CHLORIDE SERPL-SCNC: 97 MMOL/L (ref 98–107)
CO2 SERPL-SCNC: 27 MMOL/L (ref 21–32)
CREAT SERPL-MCNC: 1.4 MG/DL (ref 0.6–1.3)
GLUCOSE SERPL-MCNC: 207 MG/DL (ref 74–106)
HCT VFR BLD AUTO: 37.7 % (ref 31.2–41.9)
MAGNESIUM SERPL-MCNC: 2.1 MG/DL (ref 1.8–2.4)
MCH RBC QN AUTO: 31.5 UUG (ref 24.7–32.8)
MCV RBC AUTO: 95.3 FL (ref 75.5–95.3)
PLATELET # BLD AUTO: 154 K/UL (ref 179–408)
POTASSIUM SERPL-SCNC: 4.4 MMOL/L (ref 3.5–5.1)
WS STN SPEC: 7 G/DL (ref 6.4–8.2)

## 2022-08-20 PROCEDURE — 05H633Z INSERTION OF INFUSION DEVICE INTO LEFT SUBCLAVIAN VEIN, PERCUTANEOUS APPROACH: ICD-10-PCS

## 2022-08-20 PROCEDURE — B547ZZA ULTRASONOGRAPHY OF LEFT SUBCLAVIAN VEIN, GUIDANCE: ICD-10-PCS

## 2022-08-20 RX ADMIN — CLOPIDOGREL BISULFATE SCH MG: 75 TABLET ORAL at 08:08

## 2022-08-20 RX ADMIN — DEXTROSE PRN MLS/HR: 5 SOLUTION INTRAVENOUS at 06:18

## 2022-08-20 RX ADMIN — DEXTROSE PRN MLS/HR: 5 SOLUTION INTRAVENOUS at 21:44

## 2022-08-20 RX ADMIN — ANORECTAL OINTMENT PRN GM: 15.7; .44; 24; 20.6 OINTMENT TOPICAL at 08:07

## 2022-08-20 RX ADMIN — ASPIRIN SCH MG: 81 TABLET, CHEWABLE ORAL at 08:05

## 2022-08-20 RX ADMIN — ACETAMINOPHEN PRN MG: 325 TABLET ORAL at 11:59

## 2022-08-20 RX ADMIN — SODIUM CHLORIDE PRN UNIT: 9 INJECTION, SOLUTION INTRAVENOUS at 20:49

## 2022-08-20 RX ADMIN — PANTOPRAZOLE SODIUM SCH MG: 40 TABLET, DELAYED RELEASE ORAL at 06:08

## 2022-08-20 RX ADMIN — MIRTAZAPINE SCH MG: 15 TABLET, FILM COATED ORAL at 17:18

## 2022-08-20 RX ADMIN — ALBUTEROL SULFATE PRN MG: 1.25 SOLUTION RESPIRATORY (INHALATION) at 19:54

## 2022-08-20 RX ADMIN — FUROSEMIDE SCH MG: 10 INJECTION, SOLUTION INTRAMUSCULAR; INTRAVENOUS at 20:49

## 2022-08-20 RX ADMIN — Medication SCH EACH: at 06:47

## 2022-08-20 RX ADMIN — Medication SCH TAB: at 08:05

## 2022-08-20 RX ADMIN — Medication SCH EACH: at 17:18

## 2022-08-20 RX ADMIN — FERROUS SULFATE TAB 325 MG (65 MG ELEMENTAL FE) SCH MG: 325 (65 FE) TAB at 16:20

## 2022-08-20 RX ADMIN — CALCIUM SCH MG: 500 TABLET ORAL at 08:08

## 2022-08-20 RX ADMIN — Medication SCH EACH: at 11:59

## 2022-08-20 RX ADMIN — SODIUM CHLORIDE PRN UNIT: 9 INJECTION, SOLUTION INTRAVENOUS at 17:21

## 2022-08-20 RX ADMIN — FUROSEMIDE SCH MG: 10 INJECTION, SOLUTION INTRAMUSCULAR; INTRAVENOUS at 08:06

## 2022-08-20 RX ADMIN — Medication PRN MG: at 08:06

## 2022-08-20 RX ADMIN — ALBUTEROL SULFATE PRN MG: 1.25 SOLUTION RESPIRATORY (INHALATION) at 00:44

## 2022-08-20 RX ADMIN — LIDOCAINE SCH PATCH: 50 PATCH CUTANEOUS at 08:06

## 2022-08-20 RX ADMIN — INSULIN GLARGINE SCH UNITS: 100 INJECTION, SOLUTION SUBCUTANEOUS at 20:48

## 2022-08-20 RX ADMIN — DOCUSATE SODIUM SCH MG: 100 CAPSULE, LIQUID FILLED ORAL at 20:49

## 2022-08-20 RX ADMIN — FERROUS SULFATE TAB 325 MG (65 MG ELEMENTAL FE) SCH MG: 325 (65 FE) TAB at 08:05

## 2022-08-20 RX ADMIN — ACETAMINOPHEN PRN MG: 325 TABLET ORAL at 23:01

## 2022-08-20 RX ADMIN — LINAGLIPTIN SCH MG: 5 TABLET, FILM COATED ORAL at 08:05

## 2022-08-20 RX ADMIN — SODIUM CHLORIDE PRN UNIT: 9 INJECTION, SOLUTION INTRAVENOUS at 07:47

## 2022-08-20 RX ADMIN — POLYETHYLENE GLYCOL 3350 SCH GM: 17 POWDER, FOR SOLUTION ORAL at 11:59

## 2022-08-20 RX ADMIN — Medication SCH EACH: at 20:42

## 2022-08-20 RX ADMIN — Medication SCH MG: at 08:10

## 2022-08-20 RX ADMIN — APIXABAN SCH MG: 2.5 TABLET, FILM COATED ORAL at 20:49

## 2022-08-20 RX ADMIN — ANORECTAL OINTMENT PRN GM: 15.7; .44; 24; 20.6 OINTMENT TOPICAL at 20:42

## 2022-08-20 RX ADMIN — Medication PRN MG: at 20:50

## 2022-08-20 NOTE — NUR
called respiratory therapist patient noted to be wheezing ,sob upon exertion , on o2 at 2 
liter/min .respiratory therapist came and gave patient breathing treatment .

## 2022-08-20 NOTE — NUR
fingerstick done and patient blood glucose 147 , given Lantus insulin and regular insulin 
sliding scale .due routine medication given patient able to swallow tablet .

## 2022-08-20 NOTE — NUR
rounds made patient in bed awake ,alert and able to verbalized needs .called respiratory 
therapist to give patient breathing treatment patient noted to be sob upon exertion and mild 
wheezing noted .saturation 100 % rr 20.on 02 nasal cannula at 2 l/min . hob up . f/c to bsd 
with yellowish urine . patient on amiodarone drip and as per endorsement dr: wants 
amiodarone drip for another day .no s/s of pain .

## 2022-08-20 NOTE — NUR
DR SPEARS IN AND EXAMINED PATIENT WITH ORDER TO CONTINUE AMIODARONE DRIP FOR ANOTHER  1 
MORE DAY. PATIENT REMAINS ON 2-1 FLUTTER 0N /MIN

## 2022-08-20 NOTE — NUR
AWAKE ALERT AND VERBALLY RESPONSIVE, APPROPRIATE WITH RESPONSES. NO SS OF RESPIRATORY 
DISTRESS, CONTINUE WITH PAIN MANAGEMENT AS ORDERED

## 2022-08-20 NOTE — NUR
given Ultram table c/o pain request for pain medication generalized body pain . patient took 
medication with water .

## 2022-08-21 VITALS — SYSTOLIC BLOOD PRESSURE: 104 MMHG | DIASTOLIC BLOOD PRESSURE: 56 MMHG

## 2022-08-21 VITALS — SYSTOLIC BLOOD PRESSURE: 124 MMHG | DIASTOLIC BLOOD PRESSURE: 73 MMHG

## 2022-08-21 VITALS — SYSTOLIC BLOOD PRESSURE: 98 MMHG | DIASTOLIC BLOOD PRESSURE: 49 MMHG

## 2022-08-21 VITALS — SYSTOLIC BLOOD PRESSURE: 108 MMHG | DIASTOLIC BLOOD PRESSURE: 46 MMHG

## 2022-08-21 VITALS — SYSTOLIC BLOOD PRESSURE: 102 MMHG | DIASTOLIC BLOOD PRESSURE: 52 MMHG

## 2022-08-21 LAB
ALP SERPL-CCNC: 59 U/L (ref 50–136)
ALT SERPL W/O P-5'-P-CCNC: 14 U/L (ref 14–59)
AST SERPL-CCNC: 56 U/L (ref 15–37)
BILIRUB SERPL-MCNC: 1 MG/DL (ref 0.2–1)
BUN SERPL-MCNC: 50 MG/DL (ref 7–18)
CHLORIDE SERPL-SCNC: 95 MMOL/L (ref 98–107)
CO2 SERPL-SCNC: 23 MMOL/L (ref 21–32)
CREAT SERPL-MCNC: 1.6 MG/DL (ref 0.6–1.3)
GLUCOSE SERPL-MCNC: 229 MG/DL (ref 74–106)
HCT VFR BLD AUTO: 36.6 % (ref 31.2–41.9)
MAGNESIUM SERPL-MCNC: 2.3 MG/DL (ref 1.8–2.4)
MCH RBC QN AUTO: 31.3 UUG (ref 24.7–32.8)
MCV RBC AUTO: 95.5 FL (ref 75.5–95.3)
PLATELET # BLD AUTO: 139 K/UL (ref 179–408)
POTASSIUM SERPL-SCNC: 4.1 MMOL/L (ref 3.5–5.1)
WS STN SPEC: 6.6 G/DL (ref 6.4–8.2)

## 2022-08-21 RX ADMIN — AMIODARONE HYDROCHLORIDE SCH MG: 200 TABLET ORAL at 22:05

## 2022-08-21 RX ADMIN — Medication PRN MG: at 09:34

## 2022-08-21 RX ADMIN — CALCIUM SCH MG: 500 TABLET ORAL at 08:27

## 2022-08-21 RX ADMIN — DOCUSATE SODIUM SCH MG: 100 CAPSULE, LIQUID FILLED ORAL at 08:27

## 2022-08-21 RX ADMIN — SODIUM CHLORIDE PRN UNIT: 9 INJECTION, SOLUTION INTRAVENOUS at 22:07

## 2022-08-21 RX ADMIN — LINAGLIPTIN SCH MG: 5 TABLET, FILM COATED ORAL at 08:27

## 2022-08-21 RX ADMIN — APIXABAN SCH MG: 2.5 TABLET, FILM COATED ORAL at 22:06

## 2022-08-21 RX ADMIN — FUROSEMIDE SCH MG: 10 INJECTION, SOLUTION INTRAMUSCULAR; INTRAVENOUS at 08:27

## 2022-08-21 RX ADMIN — ANORECTAL OINTMENT PRN GM: 15.7; .44; 24; 20.6 OINTMENT TOPICAL at 08:29

## 2022-08-21 RX ADMIN — Medication SCH EACH: at 16:54

## 2022-08-21 RX ADMIN — FERROUS SULFATE TAB 325 MG (65 MG ELEMENTAL FE) SCH MG: 325 (65 FE) TAB at 16:54

## 2022-08-21 RX ADMIN — FERROUS SULFATE TAB 325 MG (65 MG ELEMENTAL FE) SCH MG: 325 (65 FE) TAB at 08:27

## 2022-08-21 RX ADMIN — Medication PRN MG: at 22:07

## 2022-08-21 RX ADMIN — Medication SCH TAB: at 08:27

## 2022-08-21 RX ADMIN — DEXTROSE PRN MLS/HR: 5 SOLUTION INTRAVENOUS at 08:07

## 2022-08-21 RX ADMIN — HYDROCORTISONE SCH GM: 25 CREAM TOPICAL at 08:28

## 2022-08-21 RX ADMIN — PANTOPRAZOLE SODIUM SCH MG: 40 TABLET, DELAYED RELEASE ORAL at 06:15

## 2022-08-21 RX ADMIN — FUROSEMIDE SCH MG: 10 INJECTION, SOLUTION INTRAMUSCULAR; INTRAVENOUS at 13:47

## 2022-08-21 RX ADMIN — SODIUM CHLORIDE PRN MG: 9 INJECTION, SOLUTION INTRAVENOUS at 05:36

## 2022-08-21 RX ADMIN — DOCUSATE SODIUM SCH MG: 100 CAPSULE, LIQUID FILLED ORAL at 22:04

## 2022-08-21 RX ADMIN — Medication SCH EACH: at 11:54

## 2022-08-21 RX ADMIN — FUROSEMIDE SCH MG: 10 INJECTION, SOLUTION INTRAMUSCULAR; INTRAVENOUS at 22:08

## 2022-08-21 RX ADMIN — SODIUM CHLORIDE PRN UNIT: 9 INJECTION, SOLUTION INTRAVENOUS at 08:04

## 2022-08-21 RX ADMIN — SILVER SULFADIAZINE SCH GM: 10 CREAM TOPICAL at 16:55

## 2022-08-21 RX ADMIN — Medication SCH EACH: at 22:04

## 2022-08-21 RX ADMIN — APIXABAN SCH MG: 2.5 TABLET, FILM COATED ORAL at 08:31

## 2022-08-21 RX ADMIN — SODIUM CHLORIDE PRN UNIT: 9 INJECTION, SOLUTION INTRAVENOUS at 11:56

## 2022-08-21 RX ADMIN — HYDROCORTISONE SCH GM: 25 CREAM TOPICAL at 16:55

## 2022-08-21 RX ADMIN — SILVER SULFADIAZINE SCH GM: 10 CREAM TOPICAL at 08:28

## 2022-08-21 RX ADMIN — MIRTAZAPINE SCH MG: 15 TABLET, FILM COATED ORAL at 17:23

## 2022-08-21 RX ADMIN — INSULIN GLARGINE SCH UNITS: 100 INJECTION, SOLUTION SUBCUTANEOUS at 22:09

## 2022-08-21 RX ADMIN — LIDOCAINE SCH PATCH: 50 PATCH CUTANEOUS at 08:26

## 2022-08-21 RX ADMIN — POLYETHYLENE GLYCOL 3350 SCH GM: 17 POWDER, FOR SOLUTION ORAL at 08:26

## 2022-08-21 RX ADMIN — ACETAMINOPHEN PRN MG: 325 TABLET ORAL at 16:54

## 2022-08-21 RX ADMIN — AMIODARONE HYDROCHLORIDE SCH MG: 200 TABLET ORAL at 09:34

## 2022-08-21 RX ADMIN — ASPIRIN SCH MG: 81 TABLET, CHEWABLE ORAL at 08:27

## 2022-08-21 RX ADMIN — Medication SCH EACH: at 06:33

## 2022-08-21 RX ADMIN — Medication SCH MG: at 08:27

## 2022-08-21 NOTE — NUR
patient called and verbalized that shes not feeling good and that maybe her sugar is low 
.checked blood sugar fingerstick done 180.given vanilla pudding .

## 2022-08-21 NOTE — NUR
AWAKE ALERT AND VERBALLY RESPONSIVE NO SS OF DISTRESS. STILL ON AMIODARONE DRIP O.5 MG/HR. 
HR SUSTAINING ON SR ON THE 80"S. CONTINUE WITH MEY STATUS

## 2022-08-21 NOTE — NUR
TOLERATING SITTING ON CHAIR WITH O2 AT 2L NC SATURATING 96%, NO SIGNS OF ACUTE DISTRESS. IN 
AND OUT SR/ST

## 2022-08-22 VITALS — SYSTOLIC BLOOD PRESSURE: 105 MMHG | DIASTOLIC BLOOD PRESSURE: 46 MMHG

## 2022-08-22 VITALS — SYSTOLIC BLOOD PRESSURE: 118 MMHG | DIASTOLIC BLOOD PRESSURE: 55 MMHG

## 2022-08-22 VITALS — DIASTOLIC BLOOD PRESSURE: 49 MMHG | SYSTOLIC BLOOD PRESSURE: 111 MMHG

## 2022-08-22 VITALS — DIASTOLIC BLOOD PRESSURE: 50 MMHG | SYSTOLIC BLOOD PRESSURE: 119 MMHG

## 2022-08-22 VITALS — SYSTOLIC BLOOD PRESSURE: 113 MMHG | DIASTOLIC BLOOD PRESSURE: 53 MMHG

## 2022-08-22 LAB
BUN SERPL-MCNC: 56 MG/DL (ref 7–18)
CHLORIDE SERPL-SCNC: 93 MMOL/L (ref 98–107)
CO2 SERPL-SCNC: 26 MMOL/L (ref 21–32)
CREAT SERPL-MCNC: 1.7 MG/DL (ref 0.6–1.3)
GLUCOSE SERPL-MCNC: 142 MG/DL (ref 74–106)
HCT VFR BLD AUTO: 34 % (ref 31.2–41.9)
MAGNESIUM SERPL-MCNC: 2.3 MG/DL (ref 1.8–2.4)
MCH RBC QN AUTO: 31.6 UUG (ref 24.7–32.8)
MCV RBC AUTO: 96.4 FL (ref 75.5–95.3)
NEUTS SEG NFR BLD MANUAL: 0 % (ref 42–75)
PLATELET # BLD AUTO: 138 K/UL (ref 179–408)
POTASSIUM SERPL-SCNC: 4.5 MMOL/L (ref 3.5–5.1)

## 2022-08-22 RX ADMIN — INSULIN GLARGINE SCH UNITS: 100 INJECTION, SOLUTION SUBCUTANEOUS at 20:18

## 2022-08-22 RX ADMIN — APIXABAN SCH MG: 2.5 TABLET, FILM COATED ORAL at 08:15

## 2022-08-22 RX ADMIN — Medication SCH EACH: at 12:43

## 2022-08-22 RX ADMIN — Medication SCH TAB: at 08:30

## 2022-08-22 RX ADMIN — METOPROLOL SUCCINATE SCH MG: 50 TABLET, FILM COATED, EXTENDED RELEASE ORAL at 08:31

## 2022-08-22 RX ADMIN — SILVER SULFADIAZINE SCH GM: 10 CREAM TOPICAL at 16:33

## 2022-08-22 RX ADMIN — ALBUTEROL SULFATE PRN MG: 1.25 SOLUTION RESPIRATORY (INHALATION) at 13:50

## 2022-08-22 RX ADMIN — PANTOPRAZOLE SODIUM SCH MG: 40 TABLET, DELAYED RELEASE ORAL at 06:09

## 2022-08-22 RX ADMIN — LIDOCAINE SCH PATCH: 50 PATCH CUTANEOUS at 08:39

## 2022-08-22 RX ADMIN — APIXABAN SCH MG: 2.5 TABLET, FILM COATED ORAL at 20:17

## 2022-08-22 RX ADMIN — SODIUM CHLORIDE PRN UNIT: 9 INJECTION, SOLUTION INTRAVENOUS at 16:40

## 2022-08-22 RX ADMIN — LIDOCAINE SCH PATCH: 50 PATCH CUTANEOUS at 08:32

## 2022-08-22 RX ADMIN — HYDROCORTISONE SCH GM: 25 CREAM TOPICAL at 16:33

## 2022-08-22 RX ADMIN — Medication SCH EACH: at 16:37

## 2022-08-22 RX ADMIN — Medication SCH EACH: at 06:10

## 2022-08-22 RX ADMIN — DOCUSATE SODIUM SCH MG: 100 CAPSULE, LIQUID FILLED ORAL at 20:17

## 2022-08-22 RX ADMIN — DOCUSATE SODIUM SCH MG: 100 CAPSULE, LIQUID FILLED ORAL at 08:15

## 2022-08-22 RX ADMIN — FUROSEMIDE SCH MG: 10 INJECTION, SOLUTION INTRAMUSCULAR; INTRAVENOUS at 21:18

## 2022-08-22 RX ADMIN — FUROSEMIDE SCH MG: 10 INJECTION, SOLUTION INTRAMUSCULAR; INTRAVENOUS at 13:14

## 2022-08-22 RX ADMIN — MIRTAZAPINE SCH MG: 15 TABLET, FILM COATED ORAL at 17:26

## 2022-08-22 RX ADMIN — POLYETHYLENE GLYCOL 3350 SCH GM: 17 POWDER, FOR SOLUTION ORAL at 08:32

## 2022-08-22 RX ADMIN — ASPIRIN SCH MG: 81 TABLET, CHEWABLE ORAL at 08:14

## 2022-08-22 RX ADMIN — FERROUS SULFATE TAB 325 MG (65 MG ELEMENTAL FE) SCH MG: 325 (65 FE) TAB at 16:32

## 2022-08-22 RX ADMIN — SODIUM CHLORIDE PRN MG: 9 INJECTION, SOLUTION INTRAVENOUS at 20:31

## 2022-08-22 RX ADMIN — CALCIUM SCH MG: 500 TABLET ORAL at 08:14

## 2022-08-22 RX ADMIN — LINAGLIPTIN SCH MG: 5 TABLET, FILM COATED ORAL at 08:30

## 2022-08-22 RX ADMIN — SILVER SULFADIAZINE SCH GM: 10 CREAM TOPICAL at 08:38

## 2022-08-22 RX ADMIN — SODIUM CHLORIDE PRN UNIT: 9 INJECTION, SOLUTION INTRAVENOUS at 12:51

## 2022-08-22 RX ADMIN — SODIUM CHLORIDE PRN UNIT: 9 INJECTION, SOLUTION INTRAVENOUS at 20:18

## 2022-08-22 RX ADMIN — HYDROCORTISONE SCH GM: 25 CREAM TOPICAL at 08:27

## 2022-08-22 RX ADMIN — Medication SCH EACH: at 20:17

## 2022-08-22 RX ADMIN — METOPROLOL SUCCINATE SCH MG: 50 TABLET, FILM COATED, EXTENDED RELEASE ORAL at 21:13

## 2022-08-22 RX ADMIN — FERROUS SULFATE TAB 325 MG (65 MG ELEMENTAL FE) SCH MG: 325 (65 FE) TAB at 08:14

## 2022-08-22 RX ADMIN — AMIODARONE HYDROCHLORIDE SCH MG: 200 TABLET ORAL at 08:15

## 2022-08-22 RX ADMIN — ACETAMINOPHEN PRN MG: 325 TABLET ORAL at 20:17

## 2022-08-22 RX ADMIN — FUROSEMIDE SCH MG: 10 INJECTION, SOLUTION INTRAMUSCULAR; INTRAVENOUS at 06:09

## 2022-08-22 RX ADMIN — SODIUM CHLORIDE PRN UNIT: 9 INJECTION, SOLUTION INTRAVENOUS at 08:44

## 2022-08-22 RX ADMIN — AMIODARONE HYDROCHLORIDE SCH MG: 200 TABLET ORAL at 20:21

## 2022-08-22 NOTE — NUR
Received patient lying in bed. In no apparent distress. No complain of pain or SOB. O2 at 
3LPM via NC in place. O2 sat at 100%. NAI midline intact and patent. NSR on tele with HR of 
79/min. Needs assessed and attended to. Safety measure initiated and call light within 
reached.

## 2022-08-22 NOTE — NUR
PATIENT REC'D IN BED, AWAKE, ALERT, ABLE TO VERBALIZE HER NEEDS AND FOLLOW COMMANDS. NO 
PHYSICAL OR RESPIRATORY DISTRESS NOTED, PATIENT DENIES PAIN. REPOSITIONED TO PROMOTE COMFORT 
AND FACILITATE PRESSURE RELIEF. SAFETY MEASURES IN PLACE. CALL LIGHT AT REACH, NO UNUSUAL 
FINDINGS.

## 2022-08-22 NOTE — NUR
PATIENT IN BED, NO RESPIRATORY DISTRESS NOTED; AWAKE, DENIES ANY DISCOMFORT. HAD A LARGE 
SOFT FORMED BM, CLEANED AND REPOSITIONED. F/CATH DRAINING TO GRAVITY, NO C/O BLADDER 
DISCOMFORT, URINE YELLOW NORMAL. SAFETY MEASURES IN PLACE AND CALL LIGHT AT REACH.

## 2022-08-23 VITALS — DIASTOLIC BLOOD PRESSURE: 73 MMHG | SYSTOLIC BLOOD PRESSURE: 151 MMHG

## 2022-08-23 VITALS — DIASTOLIC BLOOD PRESSURE: 46 MMHG | SYSTOLIC BLOOD PRESSURE: 105 MMHG

## 2022-08-23 VITALS — DIASTOLIC BLOOD PRESSURE: 45 MMHG | SYSTOLIC BLOOD PRESSURE: 102 MMHG

## 2022-08-23 VITALS — DIASTOLIC BLOOD PRESSURE: 40 MMHG | SYSTOLIC BLOOD PRESSURE: 101 MMHG

## 2022-08-23 VITALS — SYSTOLIC BLOOD PRESSURE: 106 MMHG | DIASTOLIC BLOOD PRESSURE: 48 MMHG

## 2022-08-23 LAB
BUN SERPL-MCNC: 51 MG/DL (ref 7–18)
CHLORIDE SERPL-SCNC: 96 MMOL/L (ref 98–107)
CO2 SERPL-SCNC: 28 MMOL/L (ref 21–32)
CREAT SERPL-MCNC: 1.3 MG/DL (ref 0.6–1.3)
GLUCOSE SERPL-MCNC: 127 MG/DL (ref 74–106)
HCT VFR BLD AUTO: 36.1 % (ref 31.2–41.9)
MAGNESIUM SERPL-MCNC: 2.3 MG/DL (ref 1.8–2.4)
MCH RBC QN AUTO: 30.9 UUG (ref 24.7–32.8)
MCV RBC AUTO: 96 FL (ref 75.5–95.3)
NEUTS SEG NFR BLD MANUAL: 0 % (ref 42–75)
PLATELET # BLD AUTO: 136 K/UL (ref 179–408)
POTASSIUM SERPL-SCNC: 4.8 MMOL/L (ref 3.5–5.1)

## 2022-08-23 RX ADMIN — PANTOPRAZOLE SODIUM SCH MG: 40 TABLET, DELAYED RELEASE ORAL at 06:00

## 2022-08-23 RX ADMIN — Medication PRN MG: at 08:25

## 2022-08-23 RX ADMIN — Medication SCH EACH: at 21:00

## 2022-08-23 RX ADMIN — SILVER SULFADIAZINE SCH GM: 10 CREAM TOPICAL at 16:56

## 2022-08-23 RX ADMIN — LINAGLIPTIN SCH MG: 5 TABLET, FILM COATED ORAL at 08:25

## 2022-08-23 RX ADMIN — FUROSEMIDE SCH MG: 10 INJECTION, SOLUTION INTRAMUSCULAR; INTRAVENOUS at 06:00

## 2022-08-23 RX ADMIN — FERROUS SULFATE TAB 325 MG (65 MG ELEMENTAL FE) SCH MG: 325 (65 FE) TAB at 08:24

## 2022-08-23 RX ADMIN — INSULIN GLARGINE SCH UNITS: 100 INJECTION, SOLUTION SUBCUTANEOUS at 21:00

## 2022-08-23 RX ADMIN — HYDROCORTISONE SCH GM: 25 CREAM TOPICAL at 16:56

## 2022-08-23 RX ADMIN — DOCUSATE SODIUM SCH MG: 100 CAPSULE, LIQUID FILLED ORAL at 08:24

## 2022-08-23 RX ADMIN — APIXABAN SCH MG: 2.5 TABLET, FILM COATED ORAL at 21:00

## 2022-08-23 RX ADMIN — SODIUM CHLORIDE PRN UNIT: 9 INJECTION, SOLUTION INTRAVENOUS at 23:23

## 2022-08-23 RX ADMIN — ASPIRIN SCH MG: 81 TABLET, CHEWABLE ORAL at 08:26

## 2022-08-23 RX ADMIN — METOPROLOL SUCCINATE SCH MG: 50 TABLET, FILM COATED, EXTENDED RELEASE ORAL at 21:00

## 2022-08-23 RX ADMIN — LIDOCAINE SCH PATCH: 50 PATCH CUTANEOUS at 08:24

## 2022-08-23 RX ADMIN — Medication SCH EACH: at 11:42

## 2022-08-23 RX ADMIN — SILVER SULFADIAZINE SCH GM: 10 CREAM TOPICAL at 08:28

## 2022-08-23 RX ADMIN — Medication SCH ML: at 08:29

## 2022-08-23 RX ADMIN — METOPROLOL SUCCINATE SCH MG: 50 TABLET, FILM COATED, EXTENDED RELEASE ORAL at 08:26

## 2022-08-23 RX ADMIN — Medication SCH TAB: at 08:25

## 2022-08-23 RX ADMIN — SODIUM CHLORIDE PRN UNIT: 9 INJECTION, SOLUTION INTRAVENOUS at 11:44

## 2022-08-23 RX ADMIN — CALCIUM SCH MG: 500 TABLET ORAL at 08:29

## 2022-08-23 RX ADMIN — SENNOSIDES SCH TAB: 8.6 TABLET, COATED ORAL at 21:00

## 2022-08-23 RX ADMIN — FUROSEMIDE SCH MG: 10 INJECTION, SOLUTION INTRAMUSCULAR; INTRAVENOUS at 15:12

## 2022-08-23 RX ADMIN — APIXABAN SCH MG: 2.5 TABLET, FILM COATED ORAL at 08:27

## 2022-08-23 RX ADMIN — FERROUS SULFATE TAB 325 MG (65 MG ELEMENTAL FE) SCH MG: 325 (65 FE) TAB at 16:56

## 2022-08-23 RX ADMIN — Medication SCH EACH: at 16:18

## 2022-08-23 RX ADMIN — MIRTAZAPINE SCH MG: 15 TABLET, FILM COATED ORAL at 17:13

## 2022-08-23 RX ADMIN — Medication SCH EACH: at 06:37

## 2022-08-23 RX ADMIN — AMIODARONE HYDROCHLORIDE SCH MG: 200 TABLET ORAL at 21:00

## 2022-08-23 RX ADMIN — DOCUSATE SODIUM SCH MG: 100 CAPSULE, LIQUID FILLED ORAL at 21:00

## 2022-08-23 RX ADMIN — ACETAMINOPHEN PRN MG: 325 TABLET ORAL at 23:13

## 2022-08-23 RX ADMIN — POLYETHYLENE GLYCOL 3350 SCH GM: 17 POWDER, FOR SOLUTION ORAL at 08:24

## 2022-08-23 RX ADMIN — AMIODARONE HYDROCHLORIDE SCH MG: 200 TABLET ORAL at 08:25

## 2022-08-23 RX ADMIN — ALBUTEROL SULFATE PRN MG: 1.25 SOLUTION RESPIRATORY (INHALATION) at 08:26

## 2022-08-23 RX ADMIN — HYDROCORTISONE SCH GM: 25 CREAM TOPICAL at 08:28

## 2022-08-23 NOTE — NUR
In no apparent distress. No complain of pain or SOB. No further vomiting noted after given 
Zofran IV PRN. O2 at 3LPM via NC in place. O2 sat at 100%. NAI midline intact and patent. 
NSR on tele with HR of 65/min. Needs assessed and attended to. Safety measure maintained and 
call light within reached.

## 2022-08-23 NOTE — NUR
removed a vial of bumex from ER by Yesenia ya/faith Bolton. Removed another 3 vials of bumex c/o 
Hoang GONZALEZ in ER. Will return a vial of bumex to ER since the conc endorsed by the pharmacy 
is 6mg in 36 ml of D5. Total volume of 60 ml.

## 2022-08-23 NOTE — NUR
AWAKE ALERT AND VERBALLY RESPONSIVE, SLIGHT SOB ON EXERTION WITH 3L NC SATURATING 100%, C/O 
GENERALIZED PAIN WILL CONTINUE PAIN MANAGEMENT AS ORDERED. SR ON MONITOR

## 2022-08-23 NOTE — NUR
Pt is alert, oriented, no distress, speaking in full sentences. Midline on the left upper 
arm intact

## 2022-08-24 VITALS — DIASTOLIC BLOOD PRESSURE: 50 MMHG | SYSTOLIC BLOOD PRESSURE: 120 MMHG

## 2022-08-24 VITALS — SYSTOLIC BLOOD PRESSURE: 113 MMHG | DIASTOLIC BLOOD PRESSURE: 48 MMHG

## 2022-08-24 VITALS — SYSTOLIC BLOOD PRESSURE: 109 MMHG | DIASTOLIC BLOOD PRESSURE: 44 MMHG

## 2022-08-24 VITALS — DIASTOLIC BLOOD PRESSURE: 60 MMHG | SYSTOLIC BLOOD PRESSURE: 121 MMHG

## 2022-08-24 VITALS — DIASTOLIC BLOOD PRESSURE: 45 MMHG | SYSTOLIC BLOOD PRESSURE: 110 MMHG

## 2022-08-24 LAB
BUN SERPL-MCNC: 59 MG/DL (ref 7–18)
CHLORIDE SERPL-SCNC: 95 MMOL/L (ref 98–107)
CO2 SERPL-SCNC: 29 MMOL/L (ref 21–32)
CREAT SERPL-MCNC: 1.5 MG/DL (ref 0.6–1.3)
GLUCOSE SERPL-MCNC: 77 MG/DL (ref 74–106)
HCT VFR BLD AUTO: 34.3 % (ref 31.2–41.9)
MAGNESIUM SERPL-MCNC: 2.2 MG/DL (ref 1.8–2.4)
MCH RBC QN AUTO: 31.6 UUG (ref 24.7–32.8)
MCV RBC AUTO: 95.2 FL (ref 75.5–95.3)
NEUTS SEG NFR BLD MANUAL: 0 % (ref 42–75)
PHOSPHATE SERPL-MCNC: 4 MG/DL (ref 2.5–4.9)
PLATELET # BLD AUTO: 163 K/UL (ref 179–408)
POTASSIUM SERPL-SCNC: 4.5 MMOL/L (ref 3.5–5.1)

## 2022-08-24 RX ADMIN — Medication SCH EACH: at 12:19

## 2022-08-24 RX ADMIN — CALCIUM SCH MG: 500 TABLET ORAL at 08:40

## 2022-08-24 RX ADMIN — Medication SCH TAB: at 08:41

## 2022-08-24 RX ADMIN — FERROUS SULFATE TAB 325 MG (65 MG ELEMENTAL FE) SCH MG: 325 (65 FE) TAB at 17:17

## 2022-08-24 RX ADMIN — SENNOSIDES SCH TAB: 8.6 TABLET, COATED ORAL at 21:54

## 2022-08-24 RX ADMIN — APIXABAN SCH MG: 2.5 TABLET, FILM COATED ORAL at 08:48

## 2022-08-24 RX ADMIN — AMIODARONE HYDROCHLORIDE SCH MG: 200 TABLET ORAL at 22:04

## 2022-08-24 RX ADMIN — LIDOCAINE SCH PATCH: 50 PATCH CUTANEOUS at 08:55

## 2022-08-24 RX ADMIN — Medication SCH EACH: at 06:36

## 2022-08-24 RX ADMIN — ACETAMINOPHEN PRN MG: 325 TABLET ORAL at 21:53

## 2022-08-24 RX ADMIN — LINAGLIPTIN SCH MG: 5 TABLET, FILM COATED ORAL at 08:41

## 2022-08-24 RX ADMIN — Medication SCH EACH: at 17:17

## 2022-08-24 RX ADMIN — Medication SCH ML: at 08:42

## 2022-08-24 RX ADMIN — DOCUSATE SODIUM SCH MG: 100 CAPSULE, LIQUID FILLED ORAL at 21:53

## 2022-08-24 RX ADMIN — INSULIN GLARGINE SCH UNITS: 100 INJECTION, SOLUTION SUBCUTANEOUS at 21:58

## 2022-08-24 RX ADMIN — PANTOPRAZOLE SODIUM SCH MG: 40 TABLET, DELAYED RELEASE ORAL at 06:25

## 2022-08-24 RX ADMIN — POLYETHYLENE GLYCOL 3350 SCH GM: 17 POWDER, FOR SOLUTION ORAL at 08:41

## 2022-08-24 RX ADMIN — HYDROCORTISONE SCH GM: 25 CREAM TOPICAL at 11:14

## 2022-08-24 RX ADMIN — APIXABAN SCH MG: 2.5 TABLET, FILM COATED ORAL at 21:54

## 2022-08-24 RX ADMIN — ASPIRIN SCH MG: 81 TABLET, CHEWABLE ORAL at 08:40

## 2022-08-24 RX ADMIN — SODIUM CHLORIDE PRN MG: 9 INJECTION, SOLUTION INTRAVENOUS at 13:16

## 2022-08-24 RX ADMIN — SILVER SULFADIAZINE SCH APPLIC: 10 CREAM TOPICAL at 17:47

## 2022-08-24 RX ADMIN — SODIUM CHLORIDE PRN UNIT: 9 INJECTION, SOLUTION INTRAVENOUS at 22:00

## 2022-08-24 RX ADMIN — Medication SCH EACH: at 21:58

## 2022-08-24 RX ADMIN — FERROUS SULFATE TAB 325 MG (65 MG ELEMENTAL FE) SCH MG: 325 (65 FE) TAB at 08:54

## 2022-08-24 RX ADMIN — MIRTAZAPINE SCH MG: 15 TABLET, FILM COATED ORAL at 17:17

## 2022-08-24 RX ADMIN — DOCUSATE SODIUM SCH MG: 100 CAPSULE, LIQUID FILLED ORAL at 08:40

## 2022-08-24 RX ADMIN — LIDOCAINE SCH PATCH: 50 PATCH CUTANEOUS at 08:42

## 2022-08-24 RX ADMIN — HYDROCORTISONE SCH GM: 25 CREAM TOPICAL at 17:46

## 2022-08-24 RX ADMIN — AMIODARONE HYDROCHLORIDE SCH MG: 200 TABLET ORAL at 08:50

## 2022-08-24 RX ADMIN — METOPROLOL SUCCINATE SCH MG: 50 TABLET, FILM COATED, EXTENDED RELEASE ORAL at 08:51

## 2022-08-24 RX ADMIN — SILVER SULFADIAZINE SCH APPLIC: 10 CREAM TOPICAL at 11:13

## 2022-08-24 NOTE — NUR
PT in the room assisted back to bed with head of bed elevated, kept legs elevated, had BM 
soft and black in color, moderate amount- pt on iron pill, seen by hospitalist

## 2022-08-24 NOTE — NUR
no distress noted, on 3l/nc, no dyspnea noted, all needs attended and met, call light within 
reach, contact isolation observed for MRSA on right ankle, tele remains SR with LBBB

## 2022-08-24 NOTE — NUR
Pt's capillary blood sugar check resulted to 78mg/dl. Pt offered chocolate pudding and 
tolerated well.

## 2022-08-24 NOTE — NUR
received sitting in chair, on 3l/nc 02, denies of pain, no distress noted, tele SR with LBBB 
and occasion A-pacing, needs attended and safety measures maintained

## 2022-08-25 VITALS — SYSTOLIC BLOOD PRESSURE: 115 MMHG | DIASTOLIC BLOOD PRESSURE: 61 MMHG

## 2022-08-25 VITALS — SYSTOLIC BLOOD PRESSURE: 123 MMHG | DIASTOLIC BLOOD PRESSURE: 53 MMHG

## 2022-08-25 VITALS — DIASTOLIC BLOOD PRESSURE: 57 MMHG | SYSTOLIC BLOOD PRESSURE: 115 MMHG

## 2022-08-25 VITALS — SYSTOLIC BLOOD PRESSURE: 113 MMHG | DIASTOLIC BLOOD PRESSURE: 49 MMHG

## 2022-08-25 VITALS — SYSTOLIC BLOOD PRESSURE: 119 MMHG | DIASTOLIC BLOOD PRESSURE: 44 MMHG

## 2022-08-25 LAB
BUN SERPL-MCNC: 64 MG/DL (ref 7–18)
CHLORIDE SERPL-SCNC: 94 MMOL/L (ref 98–107)
CO2 SERPL-SCNC: 29 MMOL/L (ref 21–32)
CREAT SERPL-MCNC: 1.7 MG/DL (ref 0.6–1.3)
GLUCOSE SERPL-MCNC: 114 MG/DL (ref 74–106)
HCT VFR BLD AUTO: 36 % (ref 31.2–41.9)
MAGNESIUM SERPL-MCNC: 2.3 MG/DL (ref 1.8–2.4)
MCH RBC QN AUTO: 31.1 UUG (ref 24.7–32.8)
MCV RBC AUTO: 94.9 FL (ref 75.5–95.3)
NEUTS SEG NFR BLD MANUAL: 0 % (ref 42–75)
PHOSPHATE SERPL-MCNC: 4.5 MG/DL (ref 2.5–4.9)
PLATELET # BLD AUTO: 165 K/UL (ref 179–408)
POTASSIUM SERPL-SCNC: 4.3 MMOL/L (ref 3.5–5.1)

## 2022-08-25 RX ADMIN — SILVER SULFADIAZINE SCH APPLIC: 10 CREAM TOPICAL at 10:41

## 2022-08-25 RX ADMIN — LIDOCAINE SCH PATCH: 50 PATCH CUTANEOUS at 08:26

## 2022-08-25 RX ADMIN — DOCUSATE SODIUM SCH MG: 100 CAPSULE, LIQUID FILLED ORAL at 21:00

## 2022-08-25 RX ADMIN — AMIODARONE HYDROCHLORIDE SCH MG: 200 TABLET ORAL at 08:25

## 2022-08-25 RX ADMIN — Medication SCH EACH: at 12:28

## 2022-08-25 RX ADMIN — ASPIRIN SCH MG: 81 TABLET, CHEWABLE ORAL at 08:25

## 2022-08-25 RX ADMIN — ACETAMINOPHEN PRN MG: 325 TABLET ORAL at 21:38

## 2022-08-25 RX ADMIN — SENNOSIDES SCH TAB: 8.6 TABLET, COATED ORAL at 21:00

## 2022-08-25 RX ADMIN — MIRTAZAPINE SCH MG: 15 TABLET, FILM COATED ORAL at 17:04

## 2022-08-25 RX ADMIN — Medication SCH EACH: at 06:31

## 2022-08-25 RX ADMIN — SODIUM CHLORIDE PRN UNIT: 9 INJECTION, SOLUTION INTRAVENOUS at 21:41

## 2022-08-25 RX ADMIN — LIDOCAINE SCH PATCH: 50 PATCH CUTANEOUS at 08:30

## 2022-08-25 RX ADMIN — Medication SCH EACH: at 16:29

## 2022-08-25 RX ADMIN — APIXABAN SCH MG: 2.5 TABLET, FILM COATED ORAL at 21:39

## 2022-08-25 RX ADMIN — Medication SCH ML: at 08:27

## 2022-08-25 RX ADMIN — Medication SCH TAB: at 08:26

## 2022-08-25 RX ADMIN — SODIUM CHLORIDE PRN UNIT: 9 INJECTION, SOLUTION INTRAVENOUS at 17:05

## 2022-08-25 RX ADMIN — SODIUM CHLORIDE PRN UNIT: 9 INJECTION, SOLUTION INTRAVENOUS at 12:30

## 2022-08-25 RX ADMIN — CALCIUM SCH MG: 500 TABLET ORAL at 08:26

## 2022-08-25 RX ADMIN — LINAGLIPTIN SCH MG: 5 TABLET, FILM COATED ORAL at 08:25

## 2022-08-25 RX ADMIN — FERROUS SULFATE TAB 325 MG (65 MG ELEMENTAL FE) SCH MG: 325 (65 FE) TAB at 17:04

## 2022-08-25 RX ADMIN — AMIODARONE HYDROCHLORIDE SCH MG: 200 TABLET ORAL at 21:43

## 2022-08-25 RX ADMIN — HYDROCORTISONE SCH GM: 25 CREAM TOPICAL at 17:12

## 2022-08-25 RX ADMIN — INSULIN GLARGINE SCH UNITS: 100 INJECTION, SOLUTION SUBCUTANEOUS at 21:40

## 2022-08-25 RX ADMIN — Medication SCH EACH: at 21:43

## 2022-08-25 RX ADMIN — SILVER SULFADIAZINE SCH APPLIC: 10 CREAM TOPICAL at 17:12

## 2022-08-25 RX ADMIN — DOCUSATE SODIUM SCH MG: 100 CAPSULE, LIQUID FILLED ORAL at 08:26

## 2022-08-25 RX ADMIN — APIXABAN SCH MG: 2.5 TABLET, FILM COATED ORAL at 08:27

## 2022-08-25 RX ADMIN — FERROUS SULFATE TAB 325 MG (65 MG ELEMENTAL FE) SCH MG: 325 (65 FE) TAB at 08:30

## 2022-08-25 RX ADMIN — POLYETHYLENE GLYCOL 3350 SCH GM: 17 POWDER, FOR SOLUTION ORAL at 08:26

## 2022-08-25 RX ADMIN — HYDROCORTISONE SCH GM: 25 CREAM TOPICAL at 10:41

## 2022-08-25 RX ADMIN — PANTOPRAZOLE SODIUM SCH MG: 40 TABLET, DELAYED RELEASE ORAL at 06:25

## 2022-08-25 NOTE — NUR
nasal mucosal dry and had slight bleeding but stopped right away, placed humidifier for 02- 
no further bleeding noted

## 2022-08-25 NOTE — NUR
no distress noted, remains on SR LBBB, on O2 at 2l/nc - sat at 97%, sleeping at this time, 
needs attended, Dr Trivedi placed orders and given, treatments on right leg done, contact 
isolation observed, call light within reach

## 2022-08-25 NOTE — NUR
resting in bed, assisted to chair- tolerated fairly, on 2l/nc- still with slight dyspnea 
with activity, tele SR with LBBB, denies of pain, abdomen soft, edema on lower extremities 
slightly lesser, explained plan of care, reinforcement done, call light within reach

## 2022-08-26 VITALS — SYSTOLIC BLOOD PRESSURE: 126 MMHG | DIASTOLIC BLOOD PRESSURE: 44 MMHG

## 2022-08-26 VITALS — SYSTOLIC BLOOD PRESSURE: 133 MMHG | DIASTOLIC BLOOD PRESSURE: 50 MMHG

## 2022-08-26 VITALS — DIASTOLIC BLOOD PRESSURE: 50 MMHG | SYSTOLIC BLOOD PRESSURE: 120 MMHG

## 2022-08-26 VITALS — DIASTOLIC BLOOD PRESSURE: 47 MMHG | SYSTOLIC BLOOD PRESSURE: 118 MMHG

## 2022-08-26 VITALS — DIASTOLIC BLOOD PRESSURE: 48 MMHG | SYSTOLIC BLOOD PRESSURE: 114 MMHG

## 2022-08-26 LAB
BUN SERPL-MCNC: 67 MG/DL (ref 7–18)
CHLORIDE SERPL-SCNC: 94 MMOL/L (ref 98–107)
CO2 SERPL-SCNC: 29 MMOL/L (ref 21–32)
CREAT SERPL-MCNC: 1.6 MG/DL (ref 0.6–1.3)
GLUCOSE SERPL-MCNC: 106 MG/DL (ref 74–106)
HCT VFR BLD AUTO: 36.5 % (ref 31.2–41.9)
MAGNESIUM SERPL-MCNC: 2.1 MG/DL (ref 1.8–2.4)
MCH RBC QN AUTO: 31.4 UUG (ref 24.7–32.8)
MCV RBC AUTO: 95.6 FL (ref 75.5–95.3)
NEUTS SEG NFR BLD MANUAL: 0 % (ref 42–75)
PHOSPHATE SERPL-MCNC: 4.4 MG/DL (ref 2.5–4.9)
PLATELET # BLD AUTO: 157 K/UL (ref 179–408)
POTASSIUM SERPL-SCNC: 3.2 MMOL/L (ref 3.5–5.1)
VANCOMYCIN SERPL-MCNC: 9.1 UG/ML (ref 18–26)

## 2022-08-26 RX ADMIN — Medication SCH EACH: at 06:20

## 2022-08-26 RX ADMIN — FERROUS SULFATE TAB 325 MG (65 MG ELEMENTAL FE) SCH MG: 325 (65 FE) TAB at 17:31

## 2022-08-26 RX ADMIN — Medication SCH TAB: at 10:09

## 2022-08-26 RX ADMIN — HYDROCODONE BITARTRATE AND ACETAMINOPHEN PRN TAB: 5; 325 TABLET ORAL at 10:09

## 2022-08-26 RX ADMIN — APIXABAN SCH MG: 2.5 TABLET, FILM COATED ORAL at 20:38

## 2022-08-26 RX ADMIN — SENNOSIDES SCH TAB: 8.6 TABLET, COATED ORAL at 20:39

## 2022-08-26 RX ADMIN — ASPIRIN SCH MG: 81 TABLET, CHEWABLE ORAL at 09:54

## 2022-08-26 RX ADMIN — HYDROCODONE BITARTRATE AND ACETAMINOPHEN PRN TAB: 5; 325 TABLET ORAL at 17:48

## 2022-08-26 RX ADMIN — SILVER SULFADIAZINE SCH APPLIC: 10 CREAM TOPICAL at 16:55

## 2022-08-26 RX ADMIN — Medication SCH EACH: at 11:55

## 2022-08-26 RX ADMIN — AMIODARONE HYDROCHLORIDE SCH MG: 200 TABLET ORAL at 20:38

## 2022-08-26 RX ADMIN — MIRTAZAPINE SCH MG: 15 TABLET, FILM COATED ORAL at 17:31

## 2022-08-26 RX ADMIN — POLYETHYLENE GLYCOL 3350 SCH GM: 17 POWDER, FOR SOLUTION ORAL at 09:57

## 2022-08-26 RX ADMIN — AMIODARONE HYDROCHLORIDE SCH MG: 200 TABLET ORAL at 09:54

## 2022-08-26 RX ADMIN — SODIUM CHLORIDE PRN UNIT: 9 INJECTION, SOLUTION INTRAVENOUS at 17:32

## 2022-08-26 RX ADMIN — CALCIUM SCH MG: 500 TABLET ORAL at 09:54

## 2022-08-26 RX ADMIN — DOCUSATE SODIUM SCH MG: 100 CAPSULE, LIQUID FILLED ORAL at 20:39

## 2022-08-26 RX ADMIN — INSULIN GLARGINE SCH UNITS: 100 INJECTION, SOLUTION SUBCUTANEOUS at 20:40

## 2022-08-26 RX ADMIN — LIDOCAINE SCH PATCH: 50 PATCH CUTANEOUS at 09:57

## 2022-08-26 RX ADMIN — HYDROCODONE BITARTRATE AND ACETAMINOPHEN PRN TAB: 5; 325 TABLET ORAL at 00:37

## 2022-08-26 RX ADMIN — PANTOPRAZOLE SODIUM SCH MG: 40 TABLET, DELAYED RELEASE ORAL at 06:19

## 2022-08-26 RX ADMIN — APIXABAN SCH MG: 2.5 TABLET, FILM COATED ORAL at 09:56

## 2022-08-26 RX ADMIN — SODIUM CHLORIDE PRN UNIT: 9 INJECTION, SOLUTION INTRAVENOUS at 12:04

## 2022-08-26 RX ADMIN — FERROUS SULFATE TAB 325 MG (65 MG ELEMENTAL FE) SCH MG: 325 (65 FE) TAB at 09:54

## 2022-08-26 RX ADMIN — HYDROCORTISONE SCH GM: 25 CREAM TOPICAL at 16:54

## 2022-08-26 RX ADMIN — DOCUSATE SODIUM SCH MG: 100 CAPSULE, LIQUID FILLED ORAL at 09:55

## 2022-08-26 RX ADMIN — SILVER SULFADIAZINE SCH APPLIC: 10 CREAM TOPICAL at 09:58

## 2022-08-26 RX ADMIN — Medication SCH EACH: at 16:53

## 2022-08-26 RX ADMIN — LINAGLIPTIN SCH MG: 5 TABLET, FILM COATED ORAL at 09:54

## 2022-08-26 RX ADMIN — HYDROCORTISONE SCH GM: 25 CREAM TOPICAL at 09:58

## 2022-08-26 RX ADMIN — Medication SCH EACH: at 20:40

## 2022-08-26 RX ADMIN — Medication SCH ML: at 09:57

## 2022-08-26 NOTE — NUR
pt c/o pain twice; initially had a norco with good results, 2nd c/o pain, initially asked 
for norco but refused; 2nd noorco wasted with RN ANA MARÍA; rosenthal output is 1500 ml from having 
Bumex drip.

## 2022-08-27 VITALS — DIASTOLIC BLOOD PRESSURE: 44 MMHG | SYSTOLIC BLOOD PRESSURE: 116 MMHG

## 2022-08-27 VITALS — DIASTOLIC BLOOD PRESSURE: 47 MMHG | SYSTOLIC BLOOD PRESSURE: 122 MMHG

## 2022-08-27 VITALS — DIASTOLIC BLOOD PRESSURE: 46 MMHG | SYSTOLIC BLOOD PRESSURE: 116 MMHG

## 2022-08-27 VITALS — SYSTOLIC BLOOD PRESSURE: 122 MMHG | DIASTOLIC BLOOD PRESSURE: 46 MMHG

## 2022-08-27 LAB
BUN SERPL-MCNC: 63 MG/DL (ref 7–18)
CHLORIDE SERPL-SCNC: 95 MMOL/L (ref 98–107)
CO2 SERPL-SCNC: 33 MMOL/L (ref 21–32)
CREAT SERPL-MCNC: 1.3 MG/DL (ref 0.6–1.3)
GLUCOSE SERPL-MCNC: 108 MG/DL (ref 74–106)
HCT VFR BLD AUTO: 31.5 % (ref 31.2–41.9)
MAGNESIUM SERPL-MCNC: 1.9 MG/DL (ref 1.8–2.4)
MCH RBC QN AUTO: 31.2 UUG (ref 24.7–32.8)
MCV RBC AUTO: 94.3 FL (ref 75.5–95.3)
PHOSPHATE SERPL-MCNC: 3.9 MG/DL (ref 2.5–4.9)
PLATELET # BLD AUTO: 146 K/UL (ref 179–408)
POTASSIUM SERPL-SCNC: 2.8 MMOL/L (ref 3.5–5.1)
VANCOMYCIN SERPL-MCNC: 13.4 UG/ML (ref 18–26)

## 2022-08-27 RX ADMIN — PANTOPRAZOLE SODIUM SCH MG: 40 TABLET, DELAYED RELEASE ORAL at 06:08

## 2022-08-27 RX ADMIN — LIDOCAINE SCH PATCH: 50 PATCH CUTANEOUS at 09:26

## 2022-08-27 RX ADMIN — Medication SCH EACH: at 11:47

## 2022-08-27 RX ADMIN — FERROUS SULFATE TAB 325 MG (65 MG ELEMENTAL FE) SCH MG: 325 (65 FE) TAB at 17:05

## 2022-08-27 RX ADMIN — SODIUM CHLORIDE PRN UNIT: 9 INJECTION, SOLUTION INTRAVENOUS at 16:42

## 2022-08-27 RX ADMIN — AMIODARONE HYDROCHLORIDE SCH MG: 200 TABLET ORAL at 08:31

## 2022-08-27 RX ADMIN — LIDOCAINE SCH PATCH: 50 PATCH CUTANEOUS at 08:28

## 2022-08-27 RX ADMIN — Medication SCH MEQ: at 13:20

## 2022-08-27 RX ADMIN — LINAGLIPTIN SCH MG: 5 TABLET, FILM COATED ORAL at 08:27

## 2022-08-27 RX ADMIN — Medication SCH ML: at 08:32

## 2022-08-27 RX ADMIN — CALCIUM SCH MG: 500 TABLET ORAL at 08:28

## 2022-08-27 RX ADMIN — Medication SCH EACH: at 16:41

## 2022-08-27 RX ADMIN — SODIUM CHLORIDE PRN UNIT: 9 INJECTION, SOLUTION INTRAVENOUS at 11:51

## 2022-08-27 RX ADMIN — DOCUSATE SODIUM SCH MG: 100 CAPSULE, LIQUID FILLED ORAL at 08:27

## 2022-08-27 RX ADMIN — SILVER SULFADIAZINE SCH APPLIC: 10 CREAM TOPICAL at 09:00

## 2022-08-27 RX ADMIN — Medication SCH TAB: at 08:28

## 2022-08-27 RX ADMIN — HYDROCORTISONE SCH GM: 25 CREAM TOPICAL at 09:00

## 2022-08-27 RX ADMIN — POLYETHYLENE GLYCOL 3350 SCH GM: 17 POWDER, FOR SOLUTION ORAL at 08:28

## 2022-08-27 RX ADMIN — FERROUS SULFATE TAB 325 MG (65 MG ELEMENTAL FE) SCH MG: 325 (65 FE) TAB at 08:28

## 2022-08-27 RX ADMIN — Medication SCH EACH: at 06:10

## 2022-08-27 RX ADMIN — Medication SCH MEQ: at 10:19

## 2022-08-27 RX ADMIN — ASPIRIN SCH MG: 81 TABLET, CHEWABLE ORAL at 08:27

## 2022-08-27 RX ADMIN — APIXABAN SCH MG: 2.5 TABLET, FILM COATED ORAL at 08:28

## 2022-08-27 NOTE — NUR
Patient is sitting on chair, had her lunch, no complains of pain, pt condition is stable at 
the moment.

## 2022-08-27 NOTE — NUR
Patient verbalized that she is feeling good, getting ready to be picked up by ambulance at 5 
pm. patient refused to take pictures of her lower extremity wounds because I changed her 
dressing earlier today and she had some pain on that area but now she said she would rather 
not unwrap it as she doesn't want to have any pain before she leaves

## 2022-08-27 NOTE — NUR
pt rested well in between care; had tramadol x1 last night; repositioned for comfort; safety 
maintained; CHG bath done; continue to monityor; continue plan of care.

## 2023-02-17 ENCOUNTER — HOSPITAL ENCOUNTER (INPATIENT)
Dept: HOSPITAL 12 - ER | Age: 88
LOS: 2 days | Discharge: HOSPICE | DRG: 871 | End: 2023-02-19
Payer: MEDICARE

## 2023-02-17 VITALS — DIASTOLIC BLOOD PRESSURE: 61 MMHG | SYSTOLIC BLOOD PRESSURE: 91 MMHG

## 2023-02-17 VITALS — WEIGHT: 155 LBS | HEIGHT: 60 IN | BODY MASS INDEX: 30.43 KG/M2

## 2023-02-17 VITALS — DIASTOLIC BLOOD PRESSURE: 58 MMHG | SYSTOLIC BLOOD PRESSURE: 97 MMHG

## 2023-02-17 VITALS — DIASTOLIC BLOOD PRESSURE: 56 MMHG | SYSTOLIC BLOOD PRESSURE: 103 MMHG

## 2023-02-17 VITALS — SYSTOLIC BLOOD PRESSURE: 94 MMHG | DIASTOLIC BLOOD PRESSURE: 60 MMHG

## 2023-02-17 VITALS — SYSTOLIC BLOOD PRESSURE: 101 MMHG | DIASTOLIC BLOOD PRESSURE: 58 MMHG

## 2023-02-17 DIAGNOSIS — E87.1: ICD-10-CM

## 2023-02-17 DIAGNOSIS — Z95.0: ICD-10-CM

## 2023-02-17 DIAGNOSIS — Z20.822: ICD-10-CM

## 2023-02-17 DIAGNOSIS — G92.8: ICD-10-CM

## 2023-02-17 DIAGNOSIS — E11.22: ICD-10-CM

## 2023-02-17 DIAGNOSIS — J98.11: ICD-10-CM

## 2023-02-17 DIAGNOSIS — I48.0: ICD-10-CM

## 2023-02-17 DIAGNOSIS — E87.5: ICD-10-CM

## 2023-02-17 DIAGNOSIS — Z79.899: ICD-10-CM

## 2023-02-17 DIAGNOSIS — I11.0: ICD-10-CM

## 2023-02-17 DIAGNOSIS — N17.0: ICD-10-CM

## 2023-02-17 DIAGNOSIS — I50.33: ICD-10-CM

## 2023-02-17 DIAGNOSIS — N18.32: ICD-10-CM

## 2023-02-17 DIAGNOSIS — E87.20: ICD-10-CM

## 2023-02-17 DIAGNOSIS — F03.90: ICD-10-CM

## 2023-02-17 DIAGNOSIS — E11.9: ICD-10-CM

## 2023-02-17 DIAGNOSIS — J69.0: ICD-10-CM

## 2023-02-17 DIAGNOSIS — I25.10: ICD-10-CM

## 2023-02-17 DIAGNOSIS — I13.0: ICD-10-CM

## 2023-02-17 DIAGNOSIS — Z79.82: ICD-10-CM

## 2023-02-17 DIAGNOSIS — Z79.01: ICD-10-CM

## 2023-02-17 DIAGNOSIS — I21.A1: ICD-10-CM

## 2023-02-17 DIAGNOSIS — Z79.4: ICD-10-CM

## 2023-02-17 DIAGNOSIS — A41.9: Primary | ICD-10-CM

## 2023-02-17 DIAGNOSIS — R65.20: ICD-10-CM

## 2023-02-17 DIAGNOSIS — D50.9: ICD-10-CM

## 2023-02-17 DIAGNOSIS — E78.5: ICD-10-CM

## 2023-02-17 DIAGNOSIS — Z66: ICD-10-CM

## 2023-02-17 DIAGNOSIS — Z51.5: ICD-10-CM

## 2023-02-17 DIAGNOSIS — Z95.1: ICD-10-CM

## 2023-02-17 DIAGNOSIS — J96.21: ICD-10-CM

## 2023-02-17 LAB
ALP SERPL-CCNC: 83 U/L (ref 50–136)
ALT SERPL W/O P-5'-P-CCNC: 14 U/L (ref 14–59)
AST SERPL-CCNC: 17 U/L (ref 15–37)
BASE EXCESS BLDA CALC-SCNC: -6.3 MMOL/L
BILIRUB DIRECT SERPL-MCNC: 1 MG/DL (ref 0–0.2)
BILIRUB SERPL-MCNC: 1.3 MG/DL (ref 0.2–1)
BUN SERPL-MCNC: 70 MG/DL (ref 7–18)
CHLORIDE SERPL-SCNC: 95 MMOL/L (ref 98–107)
CO2 SERPL-SCNC: 18 MMOL/L (ref 21–32)
CREAT SERPL-MCNC: 1.4 MG/DL (ref 0.6–1.3)
GLUCOSE SERPL-MCNC: 331 MG/DL (ref 74–106)
HCO3 BLDA-SCNC: 17.5 MMOL/L
HCT VFR BLD AUTO: 36.5 % (ref 31.2–41.9)
HGB BLDA OXIMETRY-MCNC: 12.7 G/DL (ref 12–16)
INHALED O2 CONCENTRATION: 44 %
INHALED O2 FLOW RATE: 6 L/MIN (ref 0–30)
MCH RBC QN AUTO: 29.6 UUG (ref 24.7–32.8)
MCV RBC AUTO: 94.5 FL (ref 75.5–95.3)
PCO2 TEMP ADJ BLDA: 29.7 MMHG (ref 35–45)
PH TEMP ADJ BLDA: 7.39 [PH] (ref 7.35–7.45)
PLATELET # BLD AUTO: 217 K/UL (ref 179–408)
PO2 TEMP ADJ BLDA: 200.1 MMHG (ref 75–100)
POTASSIUM SERPL-SCNC: 5.9 MMOL/L (ref 3.5–5.1)
SPECIMEN DRAWN FROM PATIENT: (no result)
WS STN SPEC: 6.3 G/DL (ref 6.4–8.2)

## 2023-02-17 PROCEDURE — 05H633Z INSERTION OF INFUSION DEVICE INTO LEFT SUBCLAVIAN VEIN, PERCUTANEOUS APPROACH: ICD-10-PCS

## 2023-02-17 PROCEDURE — C9113 INJ PANTOPRAZOLE SODIUM, VIA: HCPCS

## 2023-02-17 PROCEDURE — B547ZZA ULTRASONOGRAPHY OF LEFT SUBCLAVIAN VEIN, GUIDANCE: ICD-10-PCS

## 2023-02-17 PROCEDURE — G0378 HOSPITAL OBSERVATION PER HR: HCPCS

## 2023-02-17 PROCEDURE — A6209 FOAM DRSG <=16 SQ IN W/O BDR: HCPCS

## 2023-02-17 PROCEDURE — P9047 ALBUMIN (HUMAN), 25%, 50ML: HCPCS

## 2023-02-17 RX ADMIN — FUROSEMIDE SCH MG: 10 INJECTION, SOLUTION INTRAMUSCULAR; INTRAVENOUS at 22:55

## 2023-02-17 RX ADMIN — APIXABAN SCH MG: 2.5 TABLET, FILM COATED ORAL at 22:57

## 2023-02-17 RX ADMIN — Medication SCH EACH: at 21:00

## 2023-02-17 RX ADMIN — PIPERACILLIN SODIUM AND TAZOBACTAM SODIUM SCH MLS/HR: .375; 3 INJECTION, POWDER, LYOPHILIZED, FOR SOLUTION INTRAVENOUS at 22:58

## 2023-02-17 RX ADMIN — HYDROCODONE BITARTRATE AND ACETAMINOPHEN PRN TAB: 5; 325 TABLET ORAL at 23:24

## 2023-02-17 RX ADMIN — ALBUMIN HUMAN SCH MLS/HR: 250 SOLUTION INTRAVENOUS at 22:56

## 2023-02-17 RX ADMIN — AMIODARONE HYDROCHLORIDE SCH MG: 200 TABLET ORAL at 21:00

## 2023-02-18 VITALS — SYSTOLIC BLOOD PRESSURE: 86 MMHG | DIASTOLIC BLOOD PRESSURE: 56 MMHG

## 2023-02-18 VITALS — DIASTOLIC BLOOD PRESSURE: 64 MMHG | SYSTOLIC BLOOD PRESSURE: 88 MMHG

## 2023-02-18 VITALS — DIASTOLIC BLOOD PRESSURE: 55 MMHG | SYSTOLIC BLOOD PRESSURE: 103 MMHG

## 2023-02-18 VITALS — DIASTOLIC BLOOD PRESSURE: 59 MMHG | SYSTOLIC BLOOD PRESSURE: 94 MMHG

## 2023-02-18 VITALS — DIASTOLIC BLOOD PRESSURE: 46 MMHG | SYSTOLIC BLOOD PRESSURE: 82 MMHG

## 2023-02-18 VITALS — DIASTOLIC BLOOD PRESSURE: 62 MMHG | SYSTOLIC BLOOD PRESSURE: 98 MMHG

## 2023-02-18 VITALS — DIASTOLIC BLOOD PRESSURE: 57 MMHG | SYSTOLIC BLOOD PRESSURE: 110 MMHG

## 2023-02-18 VITALS — SYSTOLIC BLOOD PRESSURE: 108 MMHG | DIASTOLIC BLOOD PRESSURE: 66 MMHG

## 2023-02-18 VITALS — DIASTOLIC BLOOD PRESSURE: 61 MMHG | SYSTOLIC BLOOD PRESSURE: 94 MMHG

## 2023-02-18 VITALS — DIASTOLIC BLOOD PRESSURE: 58 MMHG | SYSTOLIC BLOOD PRESSURE: 108 MMHG

## 2023-02-18 VITALS — DIASTOLIC BLOOD PRESSURE: 64 MMHG | SYSTOLIC BLOOD PRESSURE: 116 MMHG

## 2023-02-18 VITALS — DIASTOLIC BLOOD PRESSURE: 57 MMHG | SYSTOLIC BLOOD PRESSURE: 98 MMHG

## 2023-02-18 VITALS — SYSTOLIC BLOOD PRESSURE: 107 MMHG | DIASTOLIC BLOOD PRESSURE: 69 MMHG

## 2023-02-18 VITALS — SYSTOLIC BLOOD PRESSURE: 102 MMHG | DIASTOLIC BLOOD PRESSURE: 57 MMHG

## 2023-02-18 VITALS — SYSTOLIC BLOOD PRESSURE: 102 MMHG | DIASTOLIC BLOOD PRESSURE: 65 MMHG

## 2023-02-18 VITALS — SYSTOLIC BLOOD PRESSURE: 125 MMHG | DIASTOLIC BLOOD PRESSURE: 55 MMHG

## 2023-02-18 VITALS — SYSTOLIC BLOOD PRESSURE: 130 MMHG | DIASTOLIC BLOOD PRESSURE: 68 MMHG

## 2023-02-18 VITALS — DIASTOLIC BLOOD PRESSURE: 57 MMHG | SYSTOLIC BLOOD PRESSURE: 85 MMHG

## 2023-02-18 VITALS — DIASTOLIC BLOOD PRESSURE: 45 MMHG | SYSTOLIC BLOOD PRESSURE: 82 MMHG

## 2023-02-18 VITALS — DIASTOLIC BLOOD PRESSURE: 80 MMHG | SYSTOLIC BLOOD PRESSURE: 105 MMHG

## 2023-02-18 VITALS — DIASTOLIC BLOOD PRESSURE: 59 MMHG | SYSTOLIC BLOOD PRESSURE: 90 MMHG

## 2023-02-18 LAB
ALP SERPL-CCNC: 68 U/L (ref 50–136)
ALT SERPL W/O P-5'-P-CCNC: 16 U/L (ref 14–59)
AST SERPL-CCNC: 17 U/L (ref 15–37)
BILIRUB DIRECT SERPL-MCNC: 1.1 MG/DL (ref 0–0.2)
BILIRUB SERPL-MCNC: 1.6 MG/DL (ref 0.2–1)
BUN SERPL-MCNC: 69 MG/DL (ref 7–18)
CHLORIDE SERPL-SCNC: 94 MMOL/L (ref 98–107)
CHOLEST SERPL-MCNC: 61 MG/DL (ref ?–200)
CO2 SERPL-SCNC: 23 MMOL/L (ref 21–32)
CREAT SERPL-MCNC: 1.5 MG/DL (ref 0.6–1.3)
GLUCOSE SERPL-MCNC: 249 MG/DL (ref 74–106)
HCT VFR BLD AUTO: 35.5 % (ref 31.2–41.9)
HDLC SERPL-MCNC: 19 MG/DL (ref 40–60)
MAGNESIUM SERPL-MCNC: 2.1 MG/DL (ref 1.8–2.4)
MCH RBC QN AUTO: 29.6 UUG (ref 24.7–32.8)
MCV RBC AUTO: 93.5 FL (ref 75.5–95.3)
PHOSPHATE SERPL-MCNC: 4.9 MG/DL (ref 2.5–4.9)
PLATELET # BLD AUTO: 176 K/UL (ref 179–408)
POTASSIUM SERPL-SCNC: 5.2 MMOL/L (ref 3.5–5.1)
TRIGL SERPL-MCNC: 55 MG/DL (ref 30–150)
TSH SERPL DL<=0.005 MIU/L-ACNC: 6.24 MIU/ML (ref 0.36–3.74)
WS STN SPEC: 6.6 G/DL (ref 6.4–8.2)

## 2023-02-18 RX ADMIN — Medication SCH EACH: at 06:54

## 2023-02-18 RX ADMIN — FUROSEMIDE SCH MG: 10 INJECTION, SOLUTION INTRAMUSCULAR; INTRAVENOUS at 09:42

## 2023-02-18 RX ADMIN — HYDROCODONE BITARTRATE AND ACETAMINOPHEN PRN TAB: 5; 325 TABLET ORAL at 17:09

## 2023-02-18 RX ADMIN — Medication SCH EACH: at 12:09

## 2023-02-18 RX ADMIN — DEXTROSE SCH MG: 50 INJECTION, SOLUTION INTRAVENOUS at 09:43

## 2023-02-18 RX ADMIN — Medication SCH ML: at 11:00

## 2023-02-18 RX ADMIN — AMIODARONE HYDROCHLORIDE SCH MG: 200 TABLET ORAL at 09:44

## 2023-02-18 RX ADMIN — SODIUM CHLORIDE PRN UNIT: 9 INJECTION, SOLUTION INTRAVENOUS at 12:14

## 2023-02-18 RX ADMIN — Medication SCH EACH: at 21:00

## 2023-02-18 RX ADMIN — APIXABAN SCH MG: 2.5 TABLET, FILM COATED ORAL at 21:35

## 2023-02-18 RX ADMIN — FUROSEMIDE SCH MG: 10 INJECTION, SOLUTION INTRAMUSCULAR; INTRAVENOUS at 21:33

## 2023-02-18 RX ADMIN — PIPERACILLIN SODIUM AND TAZOBACTAM SODIUM SCH MLS/HR: .375; 3 INJECTION, POWDER, LYOPHILIZED, FOR SOLUTION INTRAVENOUS at 21:09

## 2023-02-18 RX ADMIN — AMIODARONE HYDROCHLORIDE SCH MG: 200 TABLET ORAL at 21:34

## 2023-02-18 RX ADMIN — HYDROCODONE BITARTRATE AND ACETAMINOPHEN PRN TAB: 5; 325 TABLET ORAL at 09:43

## 2023-02-18 RX ADMIN — Medication SCH EACH: at 16:14

## 2023-02-18 RX ADMIN — ASPIRIN SCH MG: 81 TABLET, CHEWABLE ORAL at 09:43

## 2023-02-18 RX ADMIN — SODIUM CHLORIDE PRN UNIT: 9 INJECTION, SOLUTION INTRAVENOUS at 23:41

## 2023-02-18 RX ADMIN — ALBUMIN HUMAN SCH MLS/HR: 250 SOLUTION INTRAVENOUS at 06:09

## 2023-02-18 RX ADMIN — PIPERACILLIN SODIUM AND TAZOBACTAM SODIUM SCH MLS/HR: .375; 3 INJECTION, POWDER, LYOPHILIZED, FOR SOLUTION INTRAVENOUS at 05:02

## 2023-02-18 RX ADMIN — PIPERACILLIN SODIUM AND TAZOBACTAM SODIUM SCH MLS/HR: .375; 3 INJECTION, POWDER, LYOPHILIZED, FOR SOLUTION INTRAVENOUS at 12:09

## 2023-02-18 RX ADMIN — APIXABAN SCH MG: 2.5 TABLET, FILM COATED ORAL at 09:45

## 2023-02-18 NOTE — NUR
Comfort care only denies pain at this time on High flow oxygen 25 liter 40% Fio2 weaning 
down and tolerating well ox saturation 99%

## 2023-02-19 VITALS — SYSTOLIC BLOOD PRESSURE: 91 MMHG | DIASTOLIC BLOOD PRESSURE: 55 MMHG

## 2023-02-19 VITALS — DIASTOLIC BLOOD PRESSURE: 58 MMHG | SYSTOLIC BLOOD PRESSURE: 94 MMHG

## 2023-02-19 RX ADMIN — Medication SCH ML: at 09:44

## 2023-02-19 RX ADMIN — SODIUM CHLORIDE PRN UNIT: 9 INJECTION, SOLUTION INTRAVENOUS at 14:28

## 2023-02-19 RX ADMIN — FUROSEMIDE SCH MG: 10 INJECTION, SOLUTION INTRAMUSCULAR; INTRAVENOUS at 09:38

## 2023-02-19 RX ADMIN — AMIODARONE HYDROCHLORIDE SCH MG: 200 TABLET ORAL at 09:39

## 2023-02-19 RX ADMIN — SODIUM CHLORIDE PRN MLS/HR: 0.9 INJECTION, SOLUTION INTRAVENOUS at 14:40

## 2023-02-19 RX ADMIN — DEXTROSE SCH MG: 50 INJECTION, SOLUTION INTRAVENOUS at 09:38

## 2023-02-19 RX ADMIN — Medication SCH EACH: at 14:26

## 2023-02-19 RX ADMIN — PIPERACILLIN SODIUM AND TAZOBACTAM SODIUM SCH MLS/HR: .375; 3 INJECTION, POWDER, LYOPHILIZED, FOR SOLUTION INTRAVENOUS at 14:23

## 2023-02-19 RX ADMIN — PIPERACILLIN SODIUM AND TAZOBACTAM SODIUM SCH MLS/HR: .375; 3 INJECTION, POWDER, LYOPHILIZED, FOR SOLUTION INTRAVENOUS at 03:07

## 2023-02-19 RX ADMIN — SODIUM CHLORIDE PRN MLS/HR: 0.9 INJECTION, SOLUTION INTRAVENOUS at 03:16

## 2023-02-19 RX ADMIN — Medication SCH EACH: at 17:21

## 2023-02-19 RX ADMIN — Medication SCH EACH: at 09:37

## 2023-02-19 RX ADMIN — SODIUM CHLORIDE PRN UNIT: 9 INJECTION, SOLUTION INTRAVENOUS at 09:38

## 2023-02-19 RX ADMIN — SODIUM CHLORIDE PRN UNIT: 9 INJECTION, SOLUTION INTRAVENOUS at 17:25

## 2023-02-19 RX ADMIN — ASPIRIN SCH MG: 81 TABLET, CHEWABLE ORAL at 09:39

## 2023-02-19 RX ADMIN — APIXABAN SCH MG: 2.5 TABLET, FILM COATED ORAL at 09:43

## 2023-02-19 NOTE — NUR
received pt in bed resting, extremities weeping, right breast redness, rosenthal in place, ML in 
tact and patent. Pt a/ox2, on 3L O2 saturating at 93%, no reports of pain at this time. Pt 
has pacemaker on the left chest, EF of 35%. Pt from hospice care, here on comfort measures. 
will continue to monitor.

## 2023-02-19 NOTE — NUR
pt picked up by [facility] for discharge back to [facility] and rehab where pt was previously. 
pt alert and oriented x2, family aware of pt transfer. pt in stable condition, report given 
to mandy at facility. Midline removed, rosenthal in place, discharge paperwork, belongings, 
and instructions given to transport.
